# Patient Record
Sex: MALE | Race: WHITE | NOT HISPANIC OR LATINO | Employment: OTHER | URBAN - METROPOLITAN AREA
[De-identification: names, ages, dates, MRNs, and addresses within clinical notes are randomized per-mention and may not be internally consistent; named-entity substitution may affect disease eponyms.]

---

## 2017-11-17 DIAGNOSIS — H69.90 DYSFUNCTION OF EUSTACHIAN TUBE, UNSPECIFIED LATERALITY: Primary | ICD-10-CM

## 2017-11-21 ENCOUNTER — OFFICE VISIT (OUTPATIENT)
Dept: AUDIOLOGY | Facility: CLINIC | Age: 72
End: 2017-11-21

## 2017-11-21 ENCOUNTER — OFFICE VISIT (OUTPATIENT)
Dept: OTOLARYNGOLOGY | Facility: CLINIC | Age: 72
End: 2017-11-21

## 2017-11-21 DIAGNOSIS — H90.A22 SENSORINEURAL HEARING LOSS (SNHL) OF LEFT EAR WITH RESTRICTED HEARING OF RIGHT EAR: ICD-10-CM

## 2017-11-21 DIAGNOSIS — H61.23 EXCESSIVE CERUMEN IN BOTH EAR CANALS: ICD-10-CM

## 2017-11-21 DIAGNOSIS — H72.91 EAR DRUM PERFORATION, RIGHT: ICD-10-CM

## 2017-11-21 DIAGNOSIS — L29.9 EAR ITCHING: Primary | ICD-10-CM

## 2017-11-21 DIAGNOSIS — H90.A31 MIXED CONDUCTIVE AND SENSORINEURAL HEARING LOSS OF RIGHT EAR WITH RESTRICTED HEARING OF LEFT EAR: ICD-10-CM

## 2017-11-21 RX ORDER — CIPROFLOXACIN AND DEXAMETHASONE 3; 1 MG/ML; MG/ML
SUSPENSION/ DROPS AURICULAR (OTIC)
Qty: 7.5 ML | Refills: 1 | Status: SHIPPED | OUTPATIENT
Start: 2017-11-21 | End: 2018-03-07

## 2017-11-21 ASSESSMENT — PAIN SCALES - GENERAL: PAINLEVEL: MODERATE PAIN (4)

## 2017-11-21 NOTE — MR AVS SNAPSHOT
After Visit Summary   2017    Sabiha Pham    MRN: [id number]           Patient Information     Date Of Birth          1945        Visit Information        Provider Department      2017 8:30 AM Nissen, Rick L, MD [facility]        Today's Diagnoses     Ear itching    -  1       Follow-ups after your visit        Who to contact     Please call your clinic at [phone] to:    Ask questions about your health    Make or cancel appointments    Discuss your medicines    Learn about your test results    Speak to your doctor   If you have compliments or concerns about an experience at your clinic, or if you wish to file a complaint, please contact [facility] Patient Relations at [phone] or email us at [email or web address]         Additional Information About Your Visit        MyChart Information     [employer] is an electronic gateway that provides easy, online access to your medical records. With [employer], you can request a clinic appointment, read your test results, renew a prescription or communicate with your care team.     To sign up for [employer] visit the website at [email or web address]   You will be asked to enter the access code listed below, as well as some personal information. Please follow the directions to create your username and password.     Your access code is: [email or web address]  Expires: 2018  6:30 AM     Your access code will  in 90 days. If you need help or a new code, please contact your [facility] or call [phone] for assistance.        Care EveryWhere ID     This is your Care EveryWhere ID. This could be used by other organizations to access your [place] medical records  [id number]         Blood Pressure from Last 3 Encounters:   14 159/82    Weight from Last 3 Encounters:   14 116.6 kg (257 lb 0.9 oz)              Today, you had the following     No  orders found for display         Today's Medication Changes          These changes are accurate as of: 11/21/17  8:30 AM.  If you have any questions, ask your nurse or doctor.               Start taking these medicines.        Dose/Directions    betamethasone valerate 0.1 % ointment   Commonly known as:  VALISONE   Used for:  Ear itching   Started by:  Nissen, Rick L, MD        Apply to bilateral ears BID PRN itching.   Quantity:  15 g   Refills:  0       ciprofloxacin-dexamethasone otic suspension   Commonly known as:  CIPRODEX   Used for:  Ear itching   Started by:  Nissen, Rick L, MD        Instill 3 gtts into both ears BID x 10 days   Quantity:  7.5 mL   Refills:  1            Where to get your medicines      These medications were sent to [facility] [address] [place], [place] [address] [facility] [place] [facility] [place] [address] [address]  [address], [facility] [address]     Phone:  [phone]     betamethasone valerate 0.1 % ointment    ciprofloxacin-dexamethasone otic suspension                Primary Care Provider Office Phone # Fax #    Miguel Angel Vinson [phone]       [facility] [address] [place] [address]        Equal Access to Services     PRESLEY STOUT AH: Hadii aad ku hadasho Soomaali, waaxda luqadaha, qaybta kaalmada adeegyada, waxay idiin hayaan adeeg kharazakiya la'jeff ah. So [facility] [phone].    ATENCIÓN: Si habla español, tiene a mahajan disposición servicios gratuitos de asistencia lingüística. ame al [phone].    We comply with applicable federal civil rights laws and [place] laws. We do not discriminate on the basis of race, color, national origin, age, disability, sex, sexual orientation, or gender identity.            Thank you!     Thank you for choosing [facility] EAR NOSE AND THROAT  for your care. Our goal is always to provide you with excellent care. Hearing back from our patients is one way we can continue to improve our services. Please  take a few minutes to complete the written survey that you may receive in the mail after your visit with us. Thank you!             Your Updated Medication List - Protect others around you: Learn how to safely use, store and throw away your medicines at www.disposemymeds.org.          This list is accurate as of: 11/21/17  8:30 AM.  Always use your most recent med list.                   Brand Name Dispense Instructions for use Diagnosis    atenolol 50 MG tablet    TENORMIN     Take 50 mg by mouth daily        betamethasone valerate 0.1 % ointment    VALISONE    15 g    Apply to bilateral ears BID PRN itching.    Ear itching       ciprofloxacin-dexamethasone otic suspension    CIPRODEX    7.5 mL    Instill 3 gtts into both ears BID x 10 days    Ear itching       NASAL SPRAY SALINE NA      One spray in each nostril once daily        PAXIL 20 MG tablet   Generic drug:  PARoxetine      Take 20 mg by mouth every morning        XANAX 0.5 MG tablet   Generic drug:  ALPRAZolam      Take 0.5 mg by mouth 3 times daily as needed

## 2017-11-21 NOTE — LETTER
11/21/2017       RE: Sabiha Pham  [address]  [facility] [address]     Dear Colleague,    Thank you for referring your patient, Sabiha Pham, to the [facility] at [facility]. Please see a copy of my visit note below.    Dear Miguel Angel Gonzalez R:    I had the pleasure of seeing Sabiha Pham in followup today at the [facility] Ot[facility].    HISTORY OF PRESENT ILLNESS: Patient is a 72-year-old in today for follow-up from his last visit of 7/28/15. He is a long-time patient of mine from [place]. He did undergo a right tympanoplasty in 2014 from Dr. Morelos. OCR fixation was identified at that time and was not repaired secondary to the perforation. He has a small remaining perforation that we have been monitoring. On his follow-up today, he says he has had some recent drainage, he describes this as more moisture with finger 2 year. There is some odor. He's not had any pain. Denies any dizziness. He has high-frequency sensorineural hearing loss and tinnitus that is stable. He denies any dysphagia, hoarseness, facial paresthesias.    MEDICATIONS: Please refer to the detailed medication reconciliation performed by my nurse today, which I have reviewed and signed.     ALLERGIES:    Allergies   Allergen Reactions     Seasonal Allergies        HABITS:   Alcohol use Yes   Comment: socially    History   Smoking Status     Never Smoker   Smokeless Tobacco     Never Used         PAST MEDICAL HISTORY:  Please see today's intake form (for the remainder of the PMH) which I reviewed and signed.  Past Medical History:   Diagnosis Date     Anxiety disorder      Crohn's colitis (H)      Headache(784.0)      Hearing loss      Meniere's disease      Noise effect on both inner ears      DEEDEE (obstructive sleep apnea)      Ringing in the ears      Sleep disorder      Snoring      Weight gain        FAMILY HISTORY/SOCIAL HISTORY:    Family  History   Problem Relation Age of Onset     C.A.D. Brother      Hypertension Mother      Cardiovascular Maternal Aunt     Social History     Social History     Marital status:      Spouse name: N/A     Number of children: N/A     Years of education: N/A     Occupational History     Not on file.     Social History Main Topics     Smoking status: Never Smoker     Smokeless tobacco: Never Used     Alcohol use Yes      Comment: socially     Drug use: No     Sexual activity: Not on file     Other Topics Concern     Not on file     Social History Narrative       REVIEW OF SYSTEMS: Please see today's intake form (for the remainder of the ROS) which I have reviewed and signed.    PHYSICIAL EXAMINATION:  Constitutional: The patient was well-groomed and in no acute distress.   Skin: Warm and pink.  Psychiatric: The patient's affect was calm, cooperative, and appropriate.   Respiratory: Breathing comfortably without stridor or exertion of accessory muscles.  Eyes: Pupils were equal and reactive. Extraocular movement intact.   Head: Normocephalic and atraumatic. No lesions or scars.  Ears: Both ears examined under the microscope and cleaned of cerumen debris with suction and curet. There is some old dried drainage present as well that was cleaned. After cleaning, he has a tube size inferior perforation that looks long-term and stable. The left side was cleaned of cerumen with curettes. Following cleaning, TM looks intact with no worrisome changes or active drainage.  Nose: Sinuses were nontender. Anterior rhinoscopy revealed midline septum and absence of purulence or polyps.  Oral Cavity: Normal tongue, floor of moth, buccal mucosa, and palate. No abnormal lymph tissue in the oropharynx.   Neck: The parotid is soft without masses. Supple with normal laryngeal and tracheal landmarks.   Lymphatic: There is no palpable lymphadenopathy or other masses in the neck.   Neurologic: Alert and oriented x 3. Cranial nerves III-XI  within normal limits. Voice quality normal.  Cerebellar Function Tests:  Grossly normal    Audiogram:  Audiogram performed shows a bilateral high frequency sensorineural hearing loss above 2000 Hz. He has a low-frequency conductive loss on the right side. Maintains good discrimination at 86% and 92%.    IMPRESSION AND PLAN: Ear looks stable, recent active infection and debris within the canal cleaned. I'm going to have him use antibiotic drops 3 times a day for 10 days, 1 refill with Ciprodex. Also gave him some Valisone ointment to use for itching. We'll see him yearly to monitor and follow along the perforation, sooner with any concerns or problems.    Thank you very much for the opportunity to participate in the care of your patient.    Rick L Nissen MD

## 2017-11-21 NOTE — PROGRESS NOTES
Dear Miguel Angel Gnozalez:    I had the pleasure of seeing Sabiha Pham in followup today at the [facility] Ot[facility].    HISTORY OF PRESENT ILLNESS: Patient is a 72-year-old in today for follow-up from his last visit of 7/28/15. He is a long-time patient of mine from [place]. He did undergo a right tympanoplasty in 2014 from Dr. Morelos. OCR fixation was identified at that time and was not repaired secondary to the perforation. He has a small remaining perforation that we have been monitoring. On his follow-up today, he says he has had some recent drainage, he describes this as more moisture with finger 2 year. There is some odor. He's not had any pain. Denies any dizziness. He has high-frequency sensorineural hearing loss and tinnitus that is stable. He denies any dysphagia, hoarseness, facial paresthesias.    MEDICATIONS: Please refer to the detailed medication reconciliation performed by my nurse today, which I have reviewed and signed.     ALLERGIES:    Allergies   Allergen Reactions     Seasonal Allergies        HABITS:   Alcohol use Yes   Comment: socially    History   Smoking Status     Never Smoker   Smokeless Tobacco     Never Used         PAST MEDICAL HISTORY:  Please see today's intake form (for the remainder of the PMH) which I reviewed and signed.  Past Medical History:   Diagnosis Date     Anxiety disorder      Crohn's colitis (H)      Headache(784.0)      Hearing loss      Meniere's disease      Noise effect on both inner ears      DEEDEE (obstructive sleep apnea)      Ringing in the ears      Sleep disorder      Snoring      Weight gain        FAMILY HISTORY/SOCIAL HISTORY:    Family History   Problem Relation Age of Onset     C.A.D. Brother      Hypertension Mother      Cardiovascular Maternal Aunt     Social History     Social History     Marital status:      Spouse name: N/A     Number of children: N/A     Years of education: N/A     Occupational History      Not on file.     Social History Main Topics     Smoking status: Never Smoker     Smokeless tobacco: Never Used     Alcohol use Yes      Comment: socially     Drug use: No     Sexual activity: Not on file     Other Topics Concern     Not on file     Social History Narrative       REVIEW OF SYSTEMS: Please see today's intake form (for the remainder of the ROS) which I have reviewed and signed.    PHYSICIAL EXAMINATION:  Constitutional: The patient was well-groomed and in no acute distress.   Skin: Warm and pink.  Psychiatric: The patient's affect was calm, cooperative, and appropriate.   Respiratory: Breathing comfortably without stridor or exertion of accessory muscles.  Eyes: Pupils were equal and reactive. Extraocular movement intact.   Head: Normocephalic and atraumatic. No lesions or scars.  Ears: Both ears examined under the microscope and cleaned of cerumen debris with suction and curet. There is some old dried drainage present as well that was cleaned. After cleaning, he has a tube size inferior perforation that looks long-term and stable. The left side was cleaned of cerumen with curettes. Following cleaning, TM looks intact with no worrisome changes or active drainage.  Nose: Sinuses were nontender. Anterior rhinoscopy revealed midline septum and absence of purulence or polyps.  Oral Cavity: Normal tongue, floor of moth, buccal mucosa, and palate. No abnormal lymph tissue in the oropharynx.   Neck: The parotid is soft without masses. Supple with normal laryngeal and tracheal landmarks.   Lymphatic: There is no palpable lymphadenopathy or other masses in the neck.   Neurologic: Alert and oriented x 3. Cranial nerves III-XI within normal limits. Voice quality normal.  Cerebellar Function Tests:  Grossly normal    Audiogram:  Audiogram performed shows a bilateral high frequency sensorineural hearing loss above 2000 Hz. He has a low-frequency conductive loss on the right side. Maintains good discrimination at  86% and 92%.    IMPRESSION AND PLAN: Ear looks stable, recent active infection and debris within the canal cleaned. I'm going to have him use antibiotic drops 3 times a day for 10 days, 1 refill with Ciprodex. Also gave him some Valisone ointment to use for itching. We'll see him yearly to monitor and follow along the perforation, sooner with any concerns or problems.    Thank you very much for the opportunity to participate in the care of your patient.    Rick L Nissen MD

## 2017-11-21 NOTE — PROGRESS NOTES
AUDIOLOGY REPORT    SUMMARY: Audiology visit completed. See audiogram for results.      RECOMMENDATIONS: Follow-up with ENT.    Radha Tate.  Licensed Audiologist  [place] #[id number]

## 2017-11-21 NOTE — MR AVS SNAPSHOT
After Visit Summary   2017    Sabiha Pham    MRN: [id number]           Patient Information     Date Of Birth          1945        Visit Information        Provider Department      2017 7:30 AM Lizabeth Lindsey AuD [facility] Aud[facility]        Today's Diagnoses     Mixed conductive and sensorineural hearing loss of right ear with restricted hearing of left ear        Sensorineural hearing loss (SNHL) of left ear with restricted hearing of right ear           Follow-ups after your visit        Your next 10 appointments already scheduled     2017  8:30 AM CST   (Arrive by 8:15 AM)   RETURN NEUROTOLOGY with Rick L Nissen, MD   [facility] Throat ([facility] [place])    [address] [place] [address]  [facility] [address] [phone]              Who to contact     Please call your clinic at [phone] to:    Ask questions about your health    Make or cancel appointments    Discuss your medicines    Learn about your test results    Speak to your doctor   If you have compliments or concerns about an experience at your clinic, or if you wish to file a complaint, please contact [facility] Patient Relations at [phone] or email us at [email or web address]         Additional Information About Your Visit        MyChart Information     Spark[employer] is an electronic gateway that provides easy, online access to your medical records. With [employer], you can request a clinic appointment, read your test results, renew a prescription or communicate with your care team.     To sign up for Spark[employer]t visit the website at [email or web address]   You will be asked to enter the access code listed below, as well as some personal information. Please follow the directions to create your username and password.     Your access code is: [email or web address]  Expires: 2018  6:30 AM     Your access code will  in 90 days. If you  need help or a new code, please contact your [facility] or call [phone] for assistance.        Care EveryWhere ID     This is your Care EveryWhere ID. This could be used by other organizations to access your [place] medical records  [id number]         Blood Pressure from Last 3 Encounters:   02/11/14 159/82    Weight from Last 3 Encounters:   02/11/14 116.6 kg (257 lb 0.9 oz)              We Performed the Following     AUDIOGRAM/TYMPANOGRAM - INTERFACE     [facility] Eval & Speech Recog ([id number])     Tymps / Reflex   ([id number])        Primary Care Provider Office Phone # Fax #    Miguel Angel Vinson [phone]       [facility] [place] [address] [place] [address]        Equal Access to Services     PRESLEY STOUT : Hadii aad ku hadasho Soomaali, waaxda luqadaha, qaybta kaalmada adeegyada, waxay idiin hayaan adeeg kharash la'aan . So [facility] [phone].    ATENCIÓN: Si habla español, tiene a mahajan disposición servicios gratuitos de asistencia lingüística. [facility] [phone].    We comply with applicable federal civil rights laws and [place] laws. We do not discriminate on the basis of race, color, national origin, age, disability, sex, sexual orientation, or gender identity.            Thank you!     Thank you for choosing [facility] AUDIOLOGY  for your care. Our goal is always to provide you with excellent care. Hearing back from our patients is one way we can continue to improve our services. Please take a few minutes to complete the written survey that you may receive in the mail after your visit with us. Thank you!             Your Updated Medication List - Protect others around you: Learn how to safely use, store and throw away your medicines at www.disposemymeds.org.          This list is accurate as of: 11/21/17  7:58 AM.  Always use your most recent med list.                   Brand Name Dispense Instructions for use Diagnosis     atenolol 50 MG tablet    TENORMIN     Take 50 mg by mouth daily        NASAL SPRAY SALINE NA      One spray in each nostril once daily        ofloxacin 0.3 % otic solution    FLOXIN    10 mL    5 drops to the right ear twice a day for one week    Perforated ear drum, right       PAXIL 20 MG tablet   Generic drug:  PARoxetine      Take 20 mg by mouth every morning        XANAX 0.5 MG tablet   Generic drug:  ALPRAZolam      Take 0.5 mg by mouth 3 times daily as needed

## 2018-03-07 DIAGNOSIS — L29.9 EAR ITCHING: ICD-10-CM

## 2018-03-07 RX ORDER — CIPROFLOXACIN AND DEXAMETHASONE 3; 1 MG/ML; MG/ML
SUSPENSION/ DROPS AURICULAR (OTIC)
Qty: 7.5 ML | Refills: 1 | Status: SHIPPED
Start: 2018-03-07 | End: 2024-02-28

## 2018-03-07 NOTE — TELEPHONE ENCOUNTER
CIPRODEX  Last Written Prescription Date:  11/21/17  Last Fill Quantity: 7.5 ML   # refills: 1  Last Office Visit : 11/21/17  Future Office visit:  NONE    Routing refill request to provider for review/approval because: Drug not active on patient's medication list

## 2021-02-28 DIAGNOSIS — Z11.59 ENCOUNTER FOR SCREENING FOR OTHER VIRAL DISEASES: ICD-10-CM

## 2021-03-13 DIAGNOSIS — Z11.59 ENCOUNTER FOR SCREENING FOR OTHER VIRAL DISEASES: ICD-10-CM

## 2021-03-13 LAB
SARS-COV-2 RNA RESP QL NAA+PROBE: NORMAL
SPECIMEN SOURCE: NORMAL

## 2021-03-13 PROCEDURE — U0003 INFECTIOUS AGENT DETECTION BY NUCLEIC ACID (DNA OR RNA); SEVERE ACUTE RESPIRATORY SYNDROME CORONAVIRUS 2 (SARS-COV-2) (CORONAVIRUS DISEASE [COVID-19]), AMPLIFIED PROBE TECHNIQUE, MAKING USE OF HIGH THROUGHPUT TECHNOLOGIES AS DESCRIBED BY CMS-2020-01-R: HCPCS | Performed by: OPHTHALMOLOGY

## 2021-03-13 PROCEDURE — U0005 INFEC AGEN DETEC AMPLI PROBE: HCPCS | Performed by: OPHTHALMOLOGY

## 2021-03-14 LAB
LABORATORY COMMENT REPORT: NORMAL
SARS-COV-2 RNA RESP QL NAA+PROBE: NEGATIVE
SPECIMEN SOURCE: NORMAL

## 2021-03-15 RX ORDER — ATORVASTATIN CALCIUM 10 MG/1
10 TABLET, FILM COATED ORAL DAILY
COMMUNITY

## 2021-03-16 ENCOUNTER — ANESTHESIA EVENT (OUTPATIENT)
Dept: SURGERY | Facility: CLINIC | Age: 76
End: 2021-03-16
Payer: MEDICARE

## 2021-03-16 NOTE — ANESTHESIA PREPROCEDURE EVALUATION
Anesthesia Pre-Procedure Evaluation    Patient: Sabiha Pham   MRN: [id number] : 1945        Preoperative Diagnosis: Cataract [H26.9]   Procedure : Procedure(s):  Cataract removal with implant.     Past Medical History:   Diagnosis Date     Anxiety disorder      Crohn's colitis (H)      Headache(784.0)      Hearing loss      Meniere's disease      Noise effect on both inner ears      DEEDEE (obstructive sleep apnea)      Ringing in the ears      Sleep disorder      Snoring      Weight gain       Past Surgical History:   Procedure Laterality Date     KNEE SURGERY      x 3     NO HISTORY OF SURGERY       TYMPANOPLASTY  2014    Procedure: TYMPANOPLASTY;  Right Tympanoplasty with Facial Nerve Monitoring;  Surgeon: Carlos Morelos MD;  Location: [facility] OR     wisdom teeth removed[        Allergies   Allergen Reactions     Seasonal Allergies       Social History     Tobacco Use     Smoking status: Never Smoker     Smokeless tobacco: Never Used   Substance Use Topics     Alcohol use: Yes     Comment: socially      Wt Readings from Last 1 Encounters:   14 116.6 kg (257 lb 0.9 oz)        Anesthesia Evaluation   Pt has had prior anesthetic. Type: MAC and General.    No history of anesthetic complications       ROS/MED HX  ENT/Pulmonary:     (+) sleep apnea,     Neurologic: Comment: Meniere's disease      Cardiovascular:     (+) Dyslipidemia hypertension-----    METS/Exercise Tolerance:     Hematologic:  - neg hematologic  ROS     Musculoskeletal:  - neg musculoskeletal ROS     GI/Hepatic:     (+) Inflammatory bowel disease,     Renal/Genitourinary:  - neg Renal ROS     Endo: Comment: Morbid obesity    (+) Obesity,     Psychiatric/Substance Use:     (+) psychiatric history anxiety     Infectious Disease:  - neg infectious disease ROS     Malignancy:  - neg malignancy ROS     Other:  - neg other ROS          Physical Exam    Airway  airway exam normal      Mallampati: I   TM distance: > 3 FB   Neck ROM: full    Mouth opening: > 3 cm    Respiratory Devices and Support         Dental  no notable dental history         Cardiovascular   cardiovascular exam normal          Pulmonary   pulmonary exam normal                OUTSIDE LABS:  CBC:   Lab Results   Component Value Date    HGB 16.0 02/11/2014     BMP:   Lab Results   Component Value Date    POTASSIUM 5.2 02/11/2014     COAGS: No results found for: PTT, INR, FIBR  POC: No results found for: BGM, HCG, HCGS  HEPATIC: No results found for: ALBUMIN, PROTTOTAL, ALT, AST, GGT, ALKPHOS, BILITOTAL, BILIDIRECT, TAJ  OTHER: No results found for: PH, LACT, A1C, MARIO, PHOS, MAG, LIPASE, AMYLASE, TSH, T4, T3, CRP, SED    Anesthesia Plan    ASA Status:  3   NPO Status:  NPO Appropriate    Anesthesia Type: MAC.      Maintenance: Balanced.        Consents    Anesthesia Plan(s) and associated risks, benefits, and realistic alternatives discussed. Questions answered and patient/representative(s) expressed understanding.     - Discussed with:  Patient         Postoperative Care            Comments:                Richmond Baker CRNA, APRN LIBRADO

## 2021-03-17 ENCOUNTER — HOSPITAL ENCOUNTER (OUTPATIENT)
Facility: CLINIC | Age: 76
Discharge: HOME | End: 2021-03-17
Attending: OPHTHALMOLOGY | Admitting: OPHTHALMOLOGY

## 2021-03-17 ENCOUNTER — ANESTHESIA (OUTPATIENT)
Dept: SURGERY | Facility: CLINIC | Age: 76
End: 2021-03-17
Payer: MEDICARE

## 2021-03-17 ENCOUNTER — HOSPITAL ENCOUNTER (OUTPATIENT)
Facility: CLINIC | Age: 76
Discharge: HOME | End: 2021-03-17
Attending: OPHTHALMOLOGY | Admitting: OPHTHALMOLOGY
Payer: MEDICARE

## 2021-03-17 VITALS
OXYGEN SATURATION: 98 % | DIASTOLIC BLOOD PRESSURE: 74 MMHG | HEART RATE: 48 BPM | HEIGHT: 70 IN | WEIGHT: 257 LBS | RESPIRATION RATE: 16 BRPM | BODY MASS INDEX: 36.79 KG/M2 | TEMPERATURE: 98.4 F | SYSTOLIC BLOOD PRESSURE: 133 MMHG

## 2021-03-17 PROCEDURE — 250N000009 HC RX 250: Performed by: OPHTHALMOLOGY

## 2021-03-17 PROCEDURE — 258N000003 HC RX IP 258 OP 636: Performed by: NURSE ANESTHETIST, CERTIFIED REGISTERED

## 2021-03-17 PROCEDURE — 360N000007 HC CATARACT SURGICAL PACKAGE: Performed by: OPHTHALMOLOGY

## 2021-03-17 PROCEDURE — V2632 POST CHMBR INTRAOCULAR LENS: HCPCS | Performed by: OPHTHALMOLOGY

## 2021-03-17 PROCEDURE — 250N000009 HC RX 250: Performed by: NURSE ANESTHETIST, CERTIFIED REGISTERED

## 2021-03-17 PROCEDURE — 370N000004 HC ANESTHESIA CATARACT PACKAGE: Performed by: OPHTHALMOLOGY

## 2021-03-17 PROCEDURE — 250N000011 HC RX IP 250 OP 636: Performed by: OPHTHALMOLOGY

## 2021-03-17 PROCEDURE — 761N000008 HC RECOVERY CATRACT PACKAGE: Performed by: OPHTHALMOLOGY

## 2021-03-17 PROCEDURE — 250N000011 HC RX IP 250 OP 636: Performed by: NURSE ANESTHETIST, CERTIFIED REGISTERED

## 2021-03-17 PROCEDURE — V2788 PRESBYOPIA-CORRECT FUNCTION: HCPCS | Performed by: OPHTHALMOLOGY

## 2021-03-17 RX ORDER — PROPARACAINE HYDROCHLORIDE 5 MG/ML
SOLUTION/ DROPS OPHTHALMIC PRN
Status: DISCONTINUED | OUTPATIENT
Start: 2021-03-17 | End: 2021-03-17 | Stop reason: HOSPADM

## 2021-03-17 RX ORDER — TROPICAMIDE 10 MG/ML
1 SOLUTION/ DROPS OPHTHALMIC
Status: COMPLETED | OUTPATIENT
Start: 2021-03-17 | End: 2021-03-17

## 2021-03-17 RX ORDER — LIDOCAINE HYDROCHLORIDE 20 MG/ML
JELLY TOPICAL PRN
Status: DISCONTINUED | OUTPATIENT
Start: 2021-03-17 | End: 2021-03-17 | Stop reason: HOSPADM

## 2021-03-17 RX ORDER — BALANCED SALT SOLUTION 6.4; .75; .48; .3; 3.9; 1.7 MG/ML; MG/ML; MG/ML; MG/ML; MG/ML; MG/ML
SOLUTION OPHTHALMIC PRN
Status: DISCONTINUED | OUTPATIENT
Start: 2021-03-17 | End: 2021-03-17 | Stop reason: HOSPADM

## 2021-03-17 RX ORDER — LIDOCAINE 40 MG/G
CREAM TOPICAL
Status: DISCONTINUED | OUTPATIENT
Start: 2021-03-17 | End: 2021-03-17 | Stop reason: HOSPADM

## 2021-03-17 RX ORDER — SODIUM CHLORIDE, SODIUM LACTATE, POTASSIUM CHLORIDE, CALCIUM CHLORIDE 600; 310; 30; 20 MG/100ML; MG/100ML; MG/100ML; MG/100ML
INJECTION, SOLUTION INTRAVENOUS CONTINUOUS
Status: CANCELLED | OUTPATIENT
Start: 2021-03-17

## 2021-03-17 RX ORDER — SODIUM CHLORIDE, SODIUM LACTATE, POTASSIUM CHLORIDE, CALCIUM CHLORIDE 600; 310; 30; 20 MG/100ML; MG/100ML; MG/100ML; MG/100ML
INJECTION, SOLUTION INTRAVENOUS CONTINUOUS
Status: DISCONTINUED | OUTPATIENT
Start: 2021-03-17 | End: 2021-03-17 | Stop reason: HOSPADM

## 2021-03-17 RX ADMIN — CYCLOPENTOLATE HYDROCHLORIDE AND PHENYLEPHRINE HYDROCHLORIDE 1 DROP: 2; 10 SOLUTION/ DROPS OPHTHALMIC at 10:30

## 2021-03-17 RX ADMIN — SODIUM CHLORIDE, POTASSIUM CHLORIDE, SODIUM LACTATE AND CALCIUM CHLORIDE: 600; 310; 30; 20 INJECTION, SOLUTION INTRAVENOUS at 10:39

## 2021-03-17 RX ADMIN — CYCLOPENTOLATE HYDROCHLORIDE AND PHENYLEPHRINE HYDROCHLORIDE 1 DROP: 2; 10 SOLUTION/ DROPS OPHTHALMIC at 10:39

## 2021-03-17 RX ADMIN — LIDOCAINE HYDROCHLORIDE 0.4 ML: 10 INJECTION, SOLUTION EPIDURAL; INFILTRATION; INTRACAUDAL; PERINEURAL at 10:38

## 2021-03-17 RX ADMIN — TROPICAMIDE 1 DROP: 10 SOLUTION/ DROPS OPHTHALMIC at 10:39

## 2021-03-17 RX ADMIN — TROPICAMIDE 1 DROP: 10 SOLUTION/ DROPS OPHTHALMIC at 10:44

## 2021-03-17 RX ADMIN — CYCLOPENTOLATE HYDROCHLORIDE AND PHENYLEPHRINE HYDROCHLORIDE 1 DROP: 2; 10 SOLUTION/ DROPS OPHTHALMIC at 10:45

## 2021-03-17 RX ADMIN — TROPICAMIDE 1 DROP: 10 SOLUTION/ DROPS OPHTHALMIC at 10:29

## 2021-03-17 RX ADMIN — MIDAZOLAM 2 MG: 1 INJECTION INTRAMUSCULAR; INTRAVENOUS at 11:29

## 2021-03-17 ASSESSMENT — MIFFLIN-ST. JEOR: SCORE: 1909.05

## 2021-03-17 NOTE — OP NOTE
OPHTHALMOLOGY OPERATIVE NOTE    PATIENT: Sabiha Pham  DATE OF SURGERY: 3/17/2021  PREOPERATIVE DIAGNOSIS:  Senile Nuclear Cataract, Right eye  POSTOPERATIVE DIAGNOSIS:  Senile Nuclear Cataract, Right eye  OPERATIVE PROCEDURE:  Phacoemulsification with placement of advanced toric multifocal intraocular lens  SURGEON:  Kg Sweet MD  ANESTHESIA:  Topical / MAC  EBL:  None  SPECIMENS:  None  COMPLICATIONS:  None    PROCEDURE:  The patient was brought to the operating room at [facility].  The eye was anesthetized with topical anesthetic, and a sterile marking pen was used to yaneth the limbus at the 0 and 180 axis.  The right eye was prepped and draped in the usual fashion for cataract surgery.  A wire lid speculum was inserted.  A super sharp blade was used to make a paracentesis at the 11 O'clock position.  The super sharp blade was used to make a partial thickness temporal groove, which was 3 mm in length.  0.8 mL of non-preserved epi-Shugarcaine was injected into the anterior chamber.  Viscoelastic was used to inflate the anterior chamber through a cannula.  A 2.5 mm microkeratome was used to make a temporal clear corneal incision in a two-plane fashion.  A cystotome needle and forceps were used to make a capsulorrhexis.  Hydrodissection and hydrodelineation were performed with Balance Salt Solution.  The lens was then phacoemulsified and removed without complications.  The cortical material was removed with bimanual irrigation and aspiration.  The capsular bag was filled with viscoelastic.  A posterior chamber intraocular lens, preselected and recorded, was folded and inserted into the capsular bag.  The lens was then rotated to the correct axis according to the preoperative data, using the limbal markings and an axial gauge.  The viscoelastic was removed with the irrigation and aspiration tip.  Balanced Salt Solution with Vigamox, 150mg/0.1mL, was used to refill the anterior  chamber, and make final lens axis adjustments.  The wounds were checked for water tightness and required no suture.  The wire lid speculum was removed.  The patient's right eye was cleaned and a drop of each post-operative drop was placed, followed by a pardo shield.  The patient tolerated the procedure well, and there were no complications.      Kg Sweet MD

## 2021-03-17 NOTE — H&P
[facility]  [facility]  [address]  [facility] [address]  [phone]  Dept: [phone]    OPHTHALMOLOGY PRE-OPERATIVE  HISTORY AND PHYSICAL    DATE OF H/P:  2021    DATE OF SURGERY:  2021  PROCEDURE:  Procedure(s):  Cataract removal with implant. Symfony, Right Eye  LENS IMPLANT:  [id number] +12.0  REFRACTIVE GOAL:  PL Sph  SURGEON:  Kg Sweet MD    ANESTHESIA:  TOPICAL / MAC    OR CASE REQUIREMENTS:  Symfony toric IOL with axis at 168 degrees.    DEMOGRAPHICS:  Demographic Information on Sabiha Pham:    Sabiha Pham  Gender: male  : 1945  [address]  [facility] [address]  [phone] (home) [phone] (work)    Medical Record: [id number]  Social Security Number: [id number]  Pharmacy: [id number] [address] AT [facility]  Primary Care Provider: No Ref-Primary, Physician    Parent's names are: Data Unavailable (mother) and Data Unavailable (father).    Insurance: Payor: MEDICARE / Plan: MEDICARE / Product Type: Medicare /     OCULAR HISTORY:  Cataracts, each eye.  Astigmatism / presbyopia.    HISTORIES:  Past Medical History:   Diagnosis Date     Anxiety disorder      Crohn's colitis (H)      Headache(784.0)      Hearing loss      Meniere's disease      Noise effect on both inner ears      DEEDEE (obstructive sleep apnea)      Ringing in the ears      Sleep disorder      Snoring      Weight gain        Past Surgical History:   Procedure Laterality Date     KNEE SURGERY      x 3     NO HISTORY OF SURGERY       TYMPANOPLASTY  2014    Procedure: TYMPANOPLASTY;  Right Tympanoplasty with Facial Nerve Monitoring;  Surgeon: Carlos Morelos MD;  Location: [facility] OR     wisdom teeth removed[         Family History   Problem Relation Age of Onset     C.A.D. Brother      Hypertension Mother      Cardiovascular Maternal Aunt        Social History     Tobacco Use     Smoking status: Never Smoker      Smokeless tobacco: Never Used   Substance Use Topics     Alcohol use: Yes     Comment: socially       MEDICATIONS:  No current facility-administered medications for this encounter.      Current Outpatient Medications   Medication Sig     atenolol (TENORMIN) 50 MG tablet Take 50 mg by mouth daily     atorvastatin (LIPITOR) 10 MG tablet Take 10 mg by mouth daily     betamethasone valerate (VALISONE) 0.1 % ointment Apply to bilateral ears BID PRN itching.     ciprofloxacin-dexamethasone (CIPRODEX) otic suspension Instill 3 gtts into both ears BID x 10 days     NASAL SPRAY SALINE NA One spray in each nostril once daily     PARoxetine (PAXIL) 20 MG tablet Take 20 mg by mouth every morning       ALLERGIES:     Allergies   Allergen Reactions     Seasonal Allergies        PERTINENT SYSTEMS REVIEW:    1. No - Do you have a history of heart attack, stroke, stent, bypass or surgery on an artery in the head, neck, heart or legs?  2. No - Do you ever have any pain or discomfort in your chest?  3. No - Do you have a history of  Heart Failure?  4. No - Are you troubled by shortness of breath when walking: On the level, up a slight hill or at night?  5. No - Do you currently have a cold, bronchitis or other respiratory infection?  6. No - Do you have a cough, shortness of breath or wheezing?  7. No - Do you sometimes get pains in the calves of your legs when you walk?  8. No - Do you or anyone in your family have previous history of blood clots?  9. No - Do you or does anyone in your family have a serious bleeding problem such as prolonged bleeding following surgeries or cuts?  10. No - Have you ever had problems with anemia or been told to take iron pills?  11. No - Have you had any abnormal blood loss such as black, tarry or bloody stools, or abnormal vaginal bleeding?  12. No - Have you ever had a blood transfusion?  13. No - Have you or any of your relatives ever had problems with anesthesia?  14. No - Do you have sleep  apnea, excessive snoring or daytime drowsiness?  15. No - Do you have any prosthetic heart valves?  16. No - Do you have prosthetic joints?    EXAMINATION:  Vitals were reviewed  Vison:  Va, right - 20/40, left - 20/25;   BAT, right - 20/80, left - 20/70;  HEENT:  Cataract, otherwise unremarkable.  LUNGS:  Clear  CV:  Regular rate and rhythm without murmur  ABD:  Soft and nontender  NEURO:  Alert and nonfocal    IMPRESSION:  Patient cleared for ophthalmic surgery.  Low risk with monitored, light sedation.  I have assessed the patient's DVT risk, and no additional orders necessary.    PLAN:  Procedure(s):  Cataract removal with implant. Symfony, Right Eye      Kg Sweet MD

## 2021-03-17 NOTE — ANESTHESIA POSTPROCEDURE EVALUATION
Patient: Sabiha Pham    Procedure(s):  Cataract removal with implant. Symfony    Diagnosis:Cataract [H26.9]  Diagnosis Additional Information: No value filed.    Anesthesia Type:  MAC    Note:  Disposition: Outpatient   Postop Pain Control: Uneventful            Sign Out: Well controlled pain   PONV: No   Neuro/Psych: Uneventful            Sign Out: Acceptable/Baseline neuro status   Airway/Respiratory: Uneventful            Sign Out: Acceptable/Baseline resp. status   CV/Hemodynamics: Uneventful            Sign Out: Acceptable CV status   Other NRE: NONE   DID A NON-ROUTINE EVENT OCCUR? No         Last vitals:  Vitals:    03/17/21 1014   BP: (!) 161/85   Pulse: (!) 49   Temp: 36.9  C (98.4  F)   SpO2: 99%       Last vitals prior to Anesthesia Care Transfer:  CRNA VITALS  3/17/2021 1117 - 3/17/2021 1148      3/17/2021             NIBP:  138/72    Pulse:  (!) 47    NIBP Mean:  92    SpO2:  96 %          Electronically Signed By: ALFREDITO Ibanez CRNA  March 17, 2021  11:48 AM

## 2021-03-17 NOTE — ANESTHESIA CARE TRANSFER NOTE
Patient: Sabiha Pham    Procedure(s):  Cataract removal with implant. Symfony    Diagnosis: Cataract [H26.9]  Diagnosis Additional Information: No value filed.    Anesthesia Type:   MAC     Note:    Oropharynx: oropharynx clear of all foreign objects  Level of Consciousness: awake  Oxygen Supplementation: room air    Independent Airway: airway patency satisfactory and stable  Dentition: dentition unchanged  Vital Signs Stable: post-procedure vital signs reviewed and stable  Report to RN Given: handoff report given  Patient transferred to: Phase II          Vitals: (Last set prior to Anesthesia Care Transfer)  CRNA VITALS  3/17/2021 1101 - 3/17/2021 1131      3/17/2021             Pulse:  50    SpO2:  94 %    EKG:  NSR        Electronically Signed By: ALFREDITO Ibanez CRNA  March 17, 2021  11:31 AM

## 2021-04-10 DIAGNOSIS — Z11.59 ENCOUNTER FOR SCREENING FOR OTHER VIRAL DISEASES: ICD-10-CM

## 2021-04-10 LAB
SARS-COV-2 RNA RESP QL NAA+PROBE: NORMAL
SPECIMEN SOURCE: NORMAL

## 2021-04-10 PROCEDURE — U0005 INFEC AGEN DETEC AMPLI PROBE: HCPCS | Performed by: OPHTHALMOLOGY

## 2021-04-10 PROCEDURE — U0003 INFECTIOUS AGENT DETECTION BY NUCLEIC ACID (DNA OR RNA); SEVERE ACUTE RESPIRATORY SYNDROME CORONAVIRUS 2 (SARS-COV-2) (CORONAVIRUS DISEASE [COVID-19]), AMPLIFIED PROBE TECHNIQUE, MAKING USE OF HIGH THROUGHPUT TECHNOLOGIES AS DESCRIBED BY CMS-2020-01-R: HCPCS | Performed by: OPHTHALMOLOGY

## 2021-04-13 ENCOUNTER — ANESTHESIA EVENT (OUTPATIENT)
Dept: SURGERY | Facility: CLINIC | Age: 76
End: 2021-04-13
Payer: MEDICARE

## 2021-04-13 NOTE — ANESTHESIA PREPROCEDURE EVALUATION
Anesthesia Pre-Procedure Evaluation    Patient: Sabiha Pham   MRN: [id number] : 1945        Preoperative Diagnosis: Nuclear sclerosis of left eye [H25.12]   Procedure : Procedure(s):  Cataract  Extraction with  Symfony implant.     Past Medical History:   Diagnosis Date     Anxiety disorder      Crohn's colitis (H)      Headache(784.0)      Hearing loss      Meniere's disease      Noise effect on both inner ears      DEEDEE (obstructive sleep apnea)      Ringing in the ears      Sleep disorder      Snoring      Weight gain       Past Surgical History:   Procedure Laterality Date     KNEE SURGERY      x 3     NO HISTORY OF SURGERY       PHACOEMULSIFICATION CLEAR CORNEA WITH DELUXE INTRAOCULAR LENS IMPLANT Right 3/17/2021    Procedure: Cataract removal with implant. Symfony;  Surgeon: Kg Sweet MD;  Location: [facility] OR     TYMPANOPLASTY  2014    Procedure: TYMPANOPLASTY;  Right Tympanoplasty with Facial Nerve Monitoring;  Surgeon: Carlos Morelos MD;  Location: [facility] OR     wisdom teeth removed[        Allergies   Allergen Reactions     Seasonal Allergies       Social History     Tobacco Use     Smoking status: Never Smoker     Smokeless tobacco: Never Used   Substance Use Topics     Alcohol use: Yes     Comment: socially      Wt Readings from Last 1 Encounters:   21 116.6 kg (257 lb)        Anesthesia Evaluation   Pt has had prior anesthetic. Type: General, MAC and Regional.    No history of anesthetic complications       ROS/MED HX  ENT/Pulmonary: Comment: Hearing loss  Meniere's disease    (+) sleep apnea,     Neurologic:       Cardiovascular:     (+) Dyslipidemia hypertension-----    METS/Exercise Tolerance:     Hematologic:       Musculoskeletal:       GI/Hepatic: Comment: Crohn's colitis    (+) Inflammatory bowel disease,     Renal/Genitourinary:       Endo:     (+) Obesity,     Psychiatric/Substance Use:     (+) psychiatric history anxiety     Infectious Disease:       Malignancy:        Other:            Physical Exam    Airway        Mallampati: II   TM distance: > 3 FB   Neck ROM: full   Mouth opening: > 3 cm    Respiratory Devices and Support         Dental  no notable dental history         Cardiovascular   cardiovascular exam normal          Pulmonary   pulmonary exam normal                OUTSIDE LABS:  CBC:   Lab Results   Component Value Date    HGB 16.0 02/11/2014     BMP:   Lab Results   Component Value Date    POTASSIUM 5.2 02/11/2014     COAGS: No results found for: PTT, INR, FIBR  POC: No results found for: BGM, HCG, HCGS  HEPATIC: No results found for: ALBUMIN, PROTTOTAL, ALT, AST, GGT, ALKPHOS, BILITOTAL, BILIDIRECT, TAJ  OTHER: No results found for: PH, LACT, A1C, MARIO, PHOS, MAG, LIPASE, AMYLASE, TSH, T4, T3, CRP, SED    Anesthesia Plan    ASA Status:  2   NPO Status:  NPO Appropriate    Anesthesia Type: MAC.              Consents    Anesthesia Plan(s) and associated risks, benefits, and realistic alternatives discussed. Questions answered and patient/representative(s) expressed understanding.     - Discussed with:  Patient      - Extended Intubation/Ventilatory Support Discussed: No.      - Patient is DNR/DNI Status: No    Use of blood products discussed: No .     Postoperative Care            Comments:                ALFREDITO Larios CRNA

## 2021-04-14 ENCOUNTER — HOSPITAL ENCOUNTER (OUTPATIENT)
Facility: CLINIC | Age: 76
Discharge: HOME | End: 2021-04-14
Attending: OPHTHALMOLOGY | Admitting: OPHTHALMOLOGY

## 2021-04-14 ENCOUNTER — HOSPITAL ENCOUNTER (OUTPATIENT)
Facility: CLINIC | Age: 76
Discharge: HOME | End: 2021-04-14
Attending: OPHTHALMOLOGY | Admitting: OPHTHALMOLOGY
Payer: MEDICARE

## 2021-04-14 ENCOUNTER — ANESTHESIA (OUTPATIENT)
Dept: SURGERY | Facility: CLINIC | Age: 76
End: 2021-04-14
Payer: MEDICARE

## 2021-04-14 VITALS
OXYGEN SATURATION: 97 % | HEIGHT: 69 IN | WEIGHT: 257 LBS | DIASTOLIC BLOOD PRESSURE: 79 MMHG | TEMPERATURE: 98.2 F | BODY MASS INDEX: 38.06 KG/M2 | RESPIRATION RATE: 18 BRPM | SYSTOLIC BLOOD PRESSURE: 139 MMHG | HEART RATE: 52 BPM

## 2021-04-14 PROCEDURE — 370N000004 HC ANESTHESIA CATARACT PACKAGE: Performed by: OPHTHALMOLOGY

## 2021-04-14 PROCEDURE — 250N000009 HC RX 250: Performed by: NURSE ANESTHETIST, CERTIFIED REGISTERED

## 2021-04-14 PROCEDURE — 250N000011 HC RX IP 250 OP 636: Performed by: OPHTHALMOLOGY

## 2021-04-14 PROCEDURE — V2632 POST CHMBR INTRAOCULAR LENS: HCPCS | Performed by: OPHTHALMOLOGY

## 2021-04-14 PROCEDURE — 250N000011 HC RX IP 250 OP 636: Performed by: NURSE ANESTHETIST, CERTIFIED REGISTERED

## 2021-04-14 PROCEDURE — 360N000007 HC CATARACT SURGICAL PACKAGE: Performed by: OPHTHALMOLOGY

## 2021-04-14 PROCEDURE — 258N000003 HC RX IP 258 OP 636: Performed by: NURSE ANESTHETIST, CERTIFIED REGISTERED

## 2021-04-14 PROCEDURE — V2788 PRESBYOPIA-CORRECT FUNCTION: HCPCS | Performed by: OPHTHALMOLOGY

## 2021-04-14 PROCEDURE — 272N000002 HC OR SUPPLY OTHER OPNP: Performed by: OPHTHALMOLOGY

## 2021-04-14 PROCEDURE — 250N000009 HC RX 250: Performed by: OPHTHALMOLOGY

## 2021-04-14 PROCEDURE — 761N000008 HC RECOVERY CATRACT PACKAGE: Performed by: OPHTHALMOLOGY

## 2021-04-14 RX ORDER — BALANCED SALT SOLUTION 6.4; .75; .48; .3; 3.9; 1.7 MG/ML; MG/ML; MG/ML; MG/ML; MG/ML; MG/ML
SOLUTION OPHTHALMIC PRN
Status: DISCONTINUED | OUTPATIENT
Start: 2021-04-14 | End: 2021-04-14 | Stop reason: HOSPADM

## 2021-04-14 RX ORDER — TROPICAMIDE 10 MG/ML
1 SOLUTION/ DROPS OPHTHALMIC
Status: COMPLETED | OUTPATIENT
Start: 2021-04-14 | End: 2021-04-14

## 2021-04-14 RX ORDER — LIDOCAINE HYDROCHLORIDE 20 MG/ML
JELLY TOPICAL PRN
Status: DISCONTINUED | OUTPATIENT
Start: 2021-04-14 | End: 2021-04-14 | Stop reason: HOSPADM

## 2021-04-14 RX ORDER — PROPARACAINE HYDROCHLORIDE 5 MG/ML
SOLUTION/ DROPS OPHTHALMIC PRN
Status: DISCONTINUED | OUTPATIENT
Start: 2021-04-14 | End: 2021-04-14 | Stop reason: HOSPADM

## 2021-04-14 RX ORDER — SODIUM CHLORIDE, SODIUM LACTATE, POTASSIUM CHLORIDE, CALCIUM CHLORIDE 600; 310; 30; 20 MG/100ML; MG/100ML; MG/100ML; MG/100ML
INJECTION, SOLUTION INTRAVENOUS CONTINUOUS
Status: DISCONTINUED | OUTPATIENT
Start: 2021-04-14 | End: 2021-04-14 | Stop reason: HOSPADM

## 2021-04-14 RX ORDER — LIDOCAINE 40 MG/G
CREAM TOPICAL
Status: DISCONTINUED | OUTPATIENT
Start: 2021-04-14 | End: 2021-04-14 | Stop reason: HOSPADM

## 2021-04-14 RX ADMIN — MIDAZOLAM 2 MG: 1 INJECTION INTRAMUSCULAR; INTRAVENOUS at 09:54

## 2021-04-14 RX ADMIN — TROPICAMIDE 1 DROP: 10 SOLUTION/ DROPS OPHTHALMIC at 09:15

## 2021-04-14 RX ADMIN — CYCLOPENTOLATE HYDROCHLORIDE AND PHENYLEPHRINE HYDROCHLORIDE 1 DROP: 2; 10 SOLUTION/ DROPS OPHTHALMIC at 09:25

## 2021-04-14 RX ADMIN — CYCLOPENTOLATE HYDROCHLORIDE AND PHENYLEPHRINE HYDROCHLORIDE 1 DROP: 2; 10 SOLUTION/ DROPS OPHTHALMIC at 09:15

## 2021-04-14 RX ADMIN — LIDOCAINE HYDROCHLORIDE 0.4 ML: 10 INJECTION, SOLUTION EPIDURAL; INFILTRATION; INTRACAUDAL; PERINEURAL at 09:18

## 2021-04-14 RX ADMIN — TROPICAMIDE 1 DROP: 10 SOLUTION/ DROPS OPHTHALMIC at 09:25

## 2021-04-14 RX ADMIN — CYCLOPENTOLATE HYDROCHLORIDE AND PHENYLEPHRINE HYDROCHLORIDE 1 DROP: 2; 10 SOLUTION/ DROPS OPHTHALMIC at 09:20

## 2021-04-14 RX ADMIN — MIDAZOLAM 1 MG: 1 INJECTION INTRAMUSCULAR; INTRAVENOUS at 09:58

## 2021-04-14 RX ADMIN — TROPICAMIDE 1 DROP: 10 SOLUTION/ DROPS OPHTHALMIC at 09:20

## 2021-04-14 RX ADMIN — MIDAZOLAM 1 MG: 1 INJECTION INTRAMUSCULAR; INTRAVENOUS at 10:02

## 2021-04-14 RX ADMIN — SODIUM CHLORIDE, POTASSIUM CHLORIDE, SODIUM LACTATE AND CALCIUM CHLORIDE: 600; 310; 30; 20 INJECTION, SOLUTION INTRAVENOUS at 09:18

## 2021-04-14 ASSESSMENT — MIFFLIN-ST. JEOR: SCORE: 1901.12

## 2021-04-14 NOTE — ANESTHESIA CARE TRANSFER NOTE
Patient: Sabiha Pham    Procedure(s):  Cataract  Extraction with  Symfony implant.    Diagnosis: Nuclear sclerosis of left eye [H25.12]  Diagnosis Additional Information: No value filed.    Anesthesia Type:   MAC     Note:    Oropharynx: oropharynx clear of all foreign objects and spontaneously breathing  Level of Consciousness: awake      Independent Airway: airway patency satisfactory and stable  Dentition: dentition unchanged  Vital Signs Stable: post-procedure vital signs reviewed and stable  Report to RN Given: handoff report given  Patient transferred to: Phase II    Handoff Report: Identifed the Patient, Identified the Reponsible Provider, Reviewed the pertinent medical history, Discussed the surgical course, Reviewed Intra-OP anesthesia mangement and issues during anesthesia, Set expectations for post-procedure period and Allowed opportunity for questions and acknowledgement of understanding      Vitals: (Last set prior to Anesthesia Care Transfer)  CRNA VITALS  4/14/2021 0947 - 4/14/2021 1017      4/14/2021             Pulse:  (!) 49    SpO2:  98 %        Electronically Signed By: ALFREDITO Matta CRNA  April 14, 2021  10:17 AM

## 2021-04-14 NOTE — ANESTHESIA POSTPROCEDURE EVALUATION
Patient: Sabiha Pham    Procedure(s):  Cataract  Extraction with  Symfony implant.    Diagnosis:Nuclear sclerosis of left eye [H25.12]  Diagnosis Additional Information: No value filed.    Anesthesia Type:  MAC    Note:  Disposition: Outpatient   Postop Pain Control: Uneventful            Sign Out: Well controlled pain   PONV: No   Neuro/Psych: Uneventful            Sign Out: Acceptable/Baseline neuro status   Airway/Respiratory: Uneventful            Sign Out: Acceptable/Baseline resp. status   CV/Hemodynamics: Uneventful            Sign Out: Acceptable CV status   Other NRE: NONE   DID A NON-ROUTINE EVENT OCCUR? No         Last vitals:  Vitals:    04/14/21 0837 04/14/21 0929 04/14/21 1024   BP: (!) 159/81  124/65   Pulse:  52    Resp: 18  18   Temp: 36.8  C (98.2  F)     SpO2: 98%  99%       Last vitals prior to Anesthesia Care Transfer:  CRNA VITALS  4/14/2021 0947 - 4/14/2021 1028      4/14/2021             Pulse:  (!) 49    SpO2:  98 %          Electronically Signed By: ALFREDITO Matta CRNA  April 14, 2021  10:28 AM

## 2021-04-14 NOTE — OP NOTE
OPHTHALMOLOGY OPERATIVE NOTE    PATIENT: Sabiha Pham  DATE OF SURGERY: 4/14/2021  PREOPERATIVE DIAGNOSIS:  Senile Nuclear Cataract, Left eye  POSTOPERATIVE DIAGNOSIS:  Senile Nuclear Cataract, Left eye  OPERATIVE PROCEDURE:  Phacoemulsification with placement of advanced toric multifocal intraocular lens  SURGEON:  Kg Sweet MD  ANESTHESIA:  Topical / MAC  EBL:  None  SPECIMENS:  None  COMPLICATIONS:  None    PROCEDURE:  The patient was brought to the operating room at [facility].  The eye was anesthetized with topical anesthetic, and a sterile marking pen was used to yaneth the limbus at the 0 and 180 axis.  The left eye was prepped and draped in the usual fashion for cataract surgery.  A wire lid speculum was inserted.  A super sharp blade was used to make a paracentesis at the 5 O'clock position.  The super sharp blade was used to make a partial thickness temporal groove, which was 3 mm in length.  0.8 mL of non-preserved epi-Shugarcaine was injected into the anterior chamber.  Viscoelastic was used to inflate the anterior chamber through a cannula.  A 2.5 mm microkeratome was used to make a temporal clear corneal incision in a two-plane fashion.  A cystotome needle and forceps were used to make a capsulorrhexis.  Hydrodissection and hydrodelineation were performed with Balance Salt Solution.  The lens was then phacoemulsified and removed without complications.  The cortical material was removed with bimanual irrigation and aspiration.  The capsular bag was filled with viscoelastic.  A posterior chamber intraocular lens, preselected and recorded, was folded and inserted into the capsular bag.  The lens was then rotated to the correct axis according to the preoperative data, using the limbal markings and an axial gauge.  The viscoelastic was removed with the irrigation and aspiration tip.  Balanced Salt Solution with Vigamox, 150mg/0.1mL,was used to refill the anterior  chamber, and make final lens axis adjustments.  The wounds were checked for water tightness and required no suture.  The wire lid speculum was removed.  The patient's left eye was cleaned and a drop of each post-operative drop was placed, followed by a pardo shield.  The patient tolerated the procedure well, and there were no complications.      Kg Sweet MD

## 2021-04-15 NOTE — ADDENDUM NOTE
Addendum  created 04/15/21 1055 by Gabriela Walker APRN CRNA    Intraprocedure Event edited, Intraprocedure Staff edited

## 2023-02-24 ENCOUNTER — TRANSCRIBE ORDERS (OUTPATIENT)
Dept: OTHER | Age: 78
End: 2023-02-24

## 2023-02-24 DIAGNOSIS — C44.311 BASAL CELL CARCINOMA (BCC) OF SKIN OF NOSE: Primary | ICD-10-CM

## 2023-02-28 ENCOUNTER — TELEPHONE (OUTPATIENT)
Dept: DERMATOLOGY | Facility: CLINIC | Age: 78
End: 2023-02-28
Payer: MEDICARE

## 2023-02-28 NOTE — TELEPHONE ENCOUNTER
Left patient a voicemail to schedule a consult & mohs for BCC of skin of right nasal ala with Dr. Crowell in . Provided my direct phone number.    Marisol Leo on 2/28/2023 at 8:55 AM

## 2023-03-01 NOTE — TELEPHONE ENCOUNTER
Called patient to schedule surgery with Dr. Crowell    Date of Surgery: 05/08    Surgery type: mohs    Consult scheduled: Yes    Has patient had mohs with us before? No    Additional comments: malu Leo on 3/1/2023 at 3:12 PM

## 2023-03-26 ENCOUNTER — HEALTH MAINTENANCE LETTER (OUTPATIENT)
Age: 78
End: 2023-03-26

## 2023-05-01 ENCOUNTER — OFFICE VISIT (OUTPATIENT)
Dept: DERMATOLOGY | Facility: CLINIC | Age: 78
End: 2023-05-01
Payer: MEDICARE

## 2023-05-01 DIAGNOSIS — C44.311 BASAL CELL CARCINOMA (BCC) OF RIGHT ALA NASI: Primary | ICD-10-CM

## 2023-05-01 PROCEDURE — 99203 OFFICE O/P NEW LOW 30 MIN: CPT | Performed by: DERMATOLOGY

## 2023-05-01 RX ORDER — OFLOXACIN 3 MG/ML
SOLUTION/ DROPS OPHTHALMIC
COMMUNITY
Start: 2022-07-08 | End: 2023-06-08

## 2023-05-01 RX ORDER — PRAVASTATIN SODIUM 40 MG
20 TABLET ORAL
COMMUNITY
Start: 2022-08-15

## 2023-05-01 RX ORDER — LOSARTAN POTASSIUM 100 MG/1
1 TABLET ORAL DAILY
COMMUNITY
Start: 2022-08-15

## 2023-05-01 RX ORDER — CLOBETASOL PROPIONATE 0.5 MG/G
CREAM TOPICAL
COMMUNITY
Start: 2022-11-08

## 2023-05-01 RX ORDER — TAMSULOSIN HYDROCHLORIDE 0.4 MG/1
0.4 CAPSULE ORAL
COMMUNITY
Start: 2022-08-15

## 2023-05-01 RX ORDER — SILDENAFIL 50 MG/1
25 TABLET, FILM COATED ORAL
COMMUNITY
Start: 2022-08-15

## 2023-05-01 RX ORDER — GABAPENTIN 300 MG/1
300 CAPSULE ORAL
COMMUNITY
Start: 2022-08-15

## 2023-05-01 RX ORDER — PAROXETINE 40 MG/1
20 TABLET, FILM COATED ORAL
COMMUNITY
Start: 2022-08-15 | End: 2023-06-08

## 2023-05-01 RX ORDER — TACROLIMUS 1 MG/G
OINTMENT TOPICAL
COMMUNITY
Start: 2022-11-08

## 2023-05-01 ASSESSMENT — PAIN SCALES - GENERAL: PAINLEVEL: MODERATE PAIN (4)

## 2023-05-01 NOTE — NURSING NOTE
Sabiha Pham's chief complaint for this visit includes:  Chief Complaint   Patient presents with     Consult     Mohs consult- BCC in right nasal ala      PCP: No Ref-Primary, Physician    Referring Provider:  No referring provider defined for this encounter.    There were no vitals taken for this visit.  Moderate Pain (4)        Allergies   Allergen Reactions     Seasonal Allergies          Do you need any medication refills at today's visit?    No.      eR Singh LPN             Excision/Mohs previsit information                                                    Diagnosis: basal cell carcinoma  Site(s): Right nasal ala     Over the counter Chlorhexidine surgical soap to wash all skin below the belly button twice before surgery should be recommended for the following:  - Surgical sites below the waist  - Immunosuppressed  - Previous surgical site infection  - Anticipated wound care challenges    Medication & Allergy Information                                                      Review and update allergy and medication list.    Do you take the following medications:    Coumadin, Eliquis, Pradaxa, Xarelto:  NO   -If on Coumadin, INR should be checked within 7 days of surgery.  Range should be 3.5 or less or within therapeutic range.    Past Medical History                                                    Do you currently or have you previously had any of the following conditions:      Hepatitis:  YES- previous had it in 1970. Unsure which A,B, or C. Possibly A.     HIV/AIDS:  NO    Prolonged bleeding or bleeding disorder:  NO    Pacemaker or Defibrillator:  NO.      History of artificial or heart valve replacement:  NO    Endocarditis (inflammation of the inner lining of the heart's chambers and valves):  NO    Have you ever had a prosthetic joint infection:  NO    Pregnant or Breastfeeding:  N/A    Mobility device (wheelchair, transfer difficulty): NO    Important Reminders:                                                         Ok to take all of their medications as prescribed    Patients can eat, no need to be fasting    Patient will not be able to get the site wet for 48 hrs    No submerging wound in standing water (lake, pool, bathtub, hot tub) for 2 weeks    No physical activity for 48 hrs (further restrictions will be discussed by MD at time of visit)    If any positives, send to RN for further review  Re Singh LPN

## 2023-05-01 NOTE — PATIENT INSTRUCTIONS
General Information      Mohs Micrographic Surgery     Mohs Surgery Quick Tips     Please reserve the half day that you are with us and do not schedule any other appointments the morning of your surgery date. We cannot guarantee what time the surgery will be done as it will depend on how many times the Mohs surgeon has to go back and remove more tissue to completely remove your skin cancer.  If you suspect you will experience excess anxiety or claustrophobia during the procedure, please let the Mohs surgeon know prior to surgery to allow us time to address this. If an anti-anxiety medication is required, then you will need a designated  to drive you home.  Mohs surgery is done under local anesthesia, so you should eat breakfast and take your regularly scheduled medications. You do NOT need to fast.  Wear comfortable, loose-fitting clothing that can easily be taken off and put back on before and after surgery.  Most of your time with us will be spent waiting for tissue to be processed and carefully looked at under the microscope by the Mohs surgeon. Bring with you a book, tablet, or smartphone that you can use to spend the waiting time. You are allowed to eat lunch.  You should have a designated  if surgery will involve an area that can occlude vision. This is because you can get swelling/bruising immediately after surgery and will go home with a bulky bandage that could obstruct your view of driving safely.  In most cases, you will go home with a bulky pressure dressing over the site that will need to remain on, clean, and dry for the first 48 hours. You will not be able to get the site wet for the first 48 hours. This bulky pressure dressing is to help prevent post-op bleeding. Your nurse will provide detailed wound care instructions verbally and in writing on the day of surgery.  The overwhelming majority of patients manage their normal post-op pain with Tylenol alternating with ibuprofen.   Any time  the skin is cut surgically, including with Mohs surgery, a scar forms. Scars are unavoidable but are minimized with the Mohs surgery approach. A scar is thought to be better than a skin cancer that could become a serious risk to your future health. The goal with Mohs surgery is for the scar to be barely noticeable by an acquaintance talking with you at a normal conversational distance (say, 4-5 feet away) by 3 months after your surgery. In the meantime, please be patient as it takes about 3 months for scars to mature in appearance and begin to fade.  Do NOT wear make-up on the day of surgery if the skin cancer is on your face.  Do NOT use Neosporin on your wound. It is not effective at preventing infections and can lead to irritation similar to an allergic reaction at the wound site. Do not use harsh soaps like Dial soap or [place] on your wound as this will also lead to irritation.     What is  Mohs micrographic surgery ?     Mohs micrographic surgery is considered the most effective treatment for many skin cancers. It is named after the late Dr. Frederic Mohs, who pioneered this technique. Note that  Mohs  surgery is not an abbreviation or acronym, but is named after Dr. Mohs.  Overview of Mohs Micrographic Surgery  Skin cancer often resembles the  tip of the iceberg  with more tumor cells growing downward and outward into the skin like the roots of a tree. These  roots  are not visible with the naked eye, but instead can be seen under a microscope. With the Mohs technique, the dermatologic surgeon can precisely identify and remove an entire tumor while leaving the surrounding healthy tissue intact and unharmed. Mohs surgery has the highest success rate of all treatments for many skin cancers - up to 99%.  Mohs Surgeons  Fellowship-trained Mohs surgeons are specially trained as a cancer surgeon, pathologist, and reconstructive surgeon all in one.  Advantages of Mohs Surgery  Mohs surgery is unique and so  effective because of the way the removed tissue is microscopically examined, evaluating 100% of the surgical margins. The pathologic interpretation of the tissue margins is done on site by the Mohs surgeon, who is specially trained in the reading of these slides.     Advantages of Mohs surgery include:  - Ensuring complete cancer removal during surgery to provide the best cure rate and protect against cancer recurrence  - Minimizing the amount of healthy tissue lost  - Maximizing the functional and cosmetic outcome resulting from surgery  - Repairing the site of the cancer the same day the cancer is removed (in most cases)  - Curing skin cancer when other methods have failed  Other skin cancer treatment methods require the provider to often blindly estimate the amount of tissue to treat and remove, which can result in the unnecessary removal of healthy skin tissue, as well as the skin cancer coming back if any part of it is left behind.     Your Mohs Procedure  Mohs skin cancer surgery is an outpatient procedure, which takes place in our on-site surgical suite. Our suite is equipped with a dedicated Mohs laboratory for microscopic examination of tissue. Surgeries start early in the morning and are completed the same day, depending on the extent of the tumor and the amount of reconstruction necessary.  First, you will receive local anesthesia (i.e., injections with lidocaine anesthetic) around the area of the tumor, so you are awake during the entire procedure. The use of local anesthesia versus general anesthesia provides many benefits, including preventing a lengthy recovery, possible side effects from general anesthesia, and cost. You are completely numb in the area of the surgery, however, so the procedure is comfortable.      After the area has been numbed, all visible tumor, along with a thin layer of surrounding tissue, is taken out with a scalpel blade. After this, patients are bandaged by our nursing staff  and can rest in the reception area, cafe on the first floor, or can remain in the patient room while awaiting testing results. A specially trained technician prepares the tissue and puts it on slides for your Mohs surgeon to examine under a microscope. Waiting time can range from 60-90 minutes while the tissue is being processed. If cancer involving the edges of the removed tissue is seen by the Mohs surgeon under the microscope, then another layer of tissue is removed from the area where the cancer was detected using a scalpel blade. This way only cancerous tissue is targeted during the procedure, minimizing the loss of healthy tissue. These steps are repeated until no cancer is remaining. Most skin cancers require 1 to 3 rounds for complete removal (all performed on the same day). Aggressive cancers can take several rounds of surgery to cure. Sometimes skin cancer can appear to be small to the naked eye but in fact is larger in reality. This means that the wound left behind after skin cancer removal could be larger than what you may expect, but remember that this may be necessary to remove all the skin cancer present. You do not want any skin cancer to be left behind as that will lead to the skin cancer coming back within months to years and may require a larger surgery.  After Your Skin Cancer is Removed  When your surgery is complete, your Mohs surgeon, who has expertise in reconstructive surgery, assesses the wound and discusses your options for the best functional and cosmetic outcome.      Closing the wound:  Depending on the size of the wound and what your Mohs surgeon decides, your wound will be closed using one of the following options:  Letting the wound heal on its own from the edges and without stitches  Using stitches to close the wound in a line  Using stitches to close the wound by shifting skin from a nearby area of skin (called a  skin flap )  Using stitches to place a skin graft from another part  of the body on the wound     Every wound is specially managed to maximize the functional and cosmetic outcome. For reconstruction on the head, we take great care to make sure stitches, if needed, do not interfere with the resting positions of the eyelids, nose, mouth, and ears, so we sometimes have to use facial plastic surgery techniques to close wounds. Patients are sometimes surprised by how many stitches are used, but this is because we want to do a good job at carefully lining up the wound edges to minimize scarring and prevent the wound from opening up later on, which could lead to an infection. We try to use stitches that self-dissolve whenever possible. In most cases, you will go home with a bulky pressure dressing over the surgical site that will need to remain on, clean, and dry for the first 48 hours. You will not be able to get the site wet for the first 48 hours. This bulky pressure dressing is to help prevent post-op bleeding. Your nurse will give you detailed wound care instructions verbally and in writing on the day of your surgery.     Post-op pain  Tylenol and ibuprofen are sufficient for pain control for the overwhelming majority of patients. If you have been told by another physician not to take Tylenol (also called acetaminophen) or ibuprofen (also called Motrin or Advil), then let your Mohs surgeon know. We typically suggest taking Tylenol 1,000 mg (i.e., two extra strength tabs) every 8 hours. In between those doses of Tylenol, you can take ibuprofen 400 mg (two tabs) every eight hours. This essentially means that you would take either Tylenol or ibuprofen alternating every four hours. Do NOT take more than 4,000 mg of Tylenol or 3,200 mg of ibuprofen per 24 hour period. Icing for 15 minutes every hour will help with pain and swelling as well, and it is especially helpful for surgeries around the eyes. Keeping the wound area elevated will also help with swelling and pain. If your wound is on  your lower leg, then wearing compression stockings can reduce swelling and speed healing.     Expectations About Surgical Scars  Any time the skin is cut, the body forms a scar. This is normal and natural, and a scar is thought to be better than a skin cancer that could become a serious risk to your health. The goal with Mohs surgery is for the scar to be barely noticeable by an acquaintance talking with you at a normal conversational distance (say, 4-5 feet away) by 3 months after your surgery. In the meantime, please be patient as it takes about 3 months for scars to mature in appearance and begin to fade. It is important to wear sunscreen over a scar starting about 1 month after surgery as scars do not tan normally and can become discolored, compared with the surrounding skin, after sun exposure. Additionally, gentle scar massage can often be started about 1 month after surgery. This means gentle, kneading massage on the scar for a couple minutes several times a day. After three months of waiting to ensure all inflammation has resolved and the scar has matured, we are happy to see you if you have persistent concerns with scarring. On rare occasions, we perform steroid injections or use a laser to treat a scar. These types of treatments are not  one-and-done  and multiple sessions are usually necessary to help scar appearance.     Wound care instructions:    Leave the initial pressure dressing on for 48 hours. A pressure dressing is placed to provide consistent pressure that will decrease the chance of bleeding.    After the first 48 hours you should remove the dressing and follow the instructions below:   1) Clean the incisions/surrounding area with a gentle cleanser and warm water. This can be done in or out of the shower, but it is important to note that if you do it in the shower it should be the last thing you clean.    2) Pat the area dry with a clean towel.   3) Apply Vaseline over the sutured area.  Use a  new container or the sterile packets given to you, do not use Vaseline that is in your cupboard as this is likely contaminated. The Vaseline will keep the sutures moist, help dissolvable sutures dissolve, and prevent scabbing from occurring which can cause scarring. Do not apply anything other than Vaseline (no bacitracin, hydrogen peroxide, etc.) for the first 2 weeks as this may cause the sutures to dissolve sooner than appropriate.   4) Cover the area with a non-stick gauze and tape or another bandage of your choice.    5) Make sure that you practice good hand hygiene prior to removing the bandage and use a q tip to apply Vaseline to the area.    6) Repeat the steps above daily for the first 2 weeks after surgery.          Risks:    Scar formation at the site of tumor removal. You may need further treatment with steroid injections/lasers for scarring.    Larger than expected wound created upon removal of the skin cancer.   Poor wound healing, which may be due to the patient's underlying health conditions or failure of the wound repair method.   Excessive bleeding from the wound, which could affect wound healing and/or result in the need for more office visits.    Wound that becomes infected (an uncommon occurrence, minimized by using sterile technique).   Loss of nerve function (muscle or feeling) if a tumor invades a nerve, which can be temporary or permanent.   Regrowth of tumor after removal (more common with previously treated tumors and large, longstanding tumors).   Cosmetic or functional deformities if tumor is near or on an important structure  ture such as eyes, eyelids, nose, ears, lips, forehead, scalp, fingers, or genital area.      If you have any questions, please feel free to contact us.    Phone numbers:    During business hours (M-F 8:00-4:30 p.m.)    [facility]   [phone]

## 2023-05-01 NOTE — PROGRESS NOTES
[facility] Dermatology Note  Encounter Date: May 1, 2023  Office Visit     Dermatology Problem List:  1. NMSC  - BCC, R eriberto tovari; Mohs scheduled 5/8/23  2. Vitiligo  3. Hx of Actinic keratoses, s/p cryotherapy    Gen Derm at [facility]  ____________________________________________    Assessment & Plan:    # - BCC, R ala nasi; Mohs scheduled 5/8/23    - The nature, risks, benefits, and alternatives to Mohs surgery were discussed. The patient would like to proceed with Mohs surgery.  - He does not take anticoagulants.  - No indication for preop antibiotics.   - Likely repair, lifting or sliding flap versus interpolation flap.      Staff and Medical Student:     Nery Sepulveda, medical student, [employer]    Seen and staffed with Dr. Crowell    Provider Disclosure:   The documentation recorded by the medical student acting as scribe accurately reflects the services I personally performed and the decisions made by me. I personally performed the history, exam, assessment and plan.     Adrian Crowell DO    Department of Dermatology  [facility]: Phone: [phone], Fax:[phone]  [facility]: Phone: [phone], Fax: [phone]    ____________________________________________    CC: Consult (Mohs consult- BCC in right nasal ala )    HPI:  Mr. Sabiha Pham is a(n) 77 year old male who presents today as a new patient for Mohs surgery consultation for BCC of the right ala. He's not had Mohs before, but [facility] He does some weight bearing exercise, but is willing to hold.     Patient is otherwise feeling well, without additional skin concerns.    Labs:  Dermpath report  reviewed.    Physical Exam:  Vitals: There were no vitals taken for this visit.  SKIN: Focused examination of nose was performed.  - right nasal ala there is a 1.0x0.8cm  pearly skin colored papule with adjacent sclerotic macule  - No other lesions of concern on areas examined.     Medications:  Current Outpatient Medications   Medication     atenolol (TENORMIN) 50 MG tablet     atorvastatin (LIPITOR) 10 MG tablet     betamethasone valerate (VALISONE) 0.1 % ointment     ciprofloxacin-dexamethasone (CIPRODEX) otic suspension     clobetasol (TEMOVATE) 0.05 % external cream     gabapentin (NEURONTIN) 300 MG capsule     losartan (COZAAR) 100 MG tablet     NASAL SPRAY SALINE NA     PARoxetine (PAXIL) 20 MG tablet     PARoxetine (PAXIL) 40 MG tablet     pravastatin (PRAVACHOL) 40 MG tablet     sildenafil (VIAGRA) 50 MG tablet     tacrolimus (PROTOPIC) 0.1 % external ointment     tamsulosin (FLOMAX) 0.4 MG capsule     ofloxacin (OCUFLOX) 0.3 % ophthalmic solution     No current facility-administered medications for this visit.      Past Medical History:   Patient Active Problem List   Diagnosis     Otorrhea     Perforated ear drum     Past Medical History:   Diagnosis Date     Anxiety disorder      Crohn's colitis (H)      Headache(784.0)      Hearing loss      Meniere's disease      Noise effect on both inner ears      DEEDEE (obstructive sleep apnea)      Ringing in the ears      Sleep disorder      Snoring      Weight gain

## 2023-05-01 NOTE — LETTER
5/1/2023         RE: Sabiha Pham  [address]  [place] [address]        Dear Colleague,    Thank you for referring your patient, Sabiha Pham, to the [facility]. Please see a copy of my visit note below.    [facility] Dermatology Note  Encounter Date: May 1, 2023  Office Visit     Dermatology Problem List:  1. NMSC  - BCC, R eriberto coronel; Mohs scheduled 5/8/23  2. Vitiligo  3. Hx of Actinic keratoses, s/p cryotherapy    Gen Derm at [facility]  ____________________________________________    Assessment & Plan:    # - BCC, R ala nasi; Mohs scheduled 5/8/23    - The nature, risks, benefits, and alternatives to Mohs surgery were discussed. The patient would like to proceed with Mohs surgery.  - He does not take anticoagulants.  - No indication for preop antibiotics.   - Likely repair, lifting or sliding flap versus interpolation flap.      Staff and Medical Student:     Nery Sepulveda, medical student, [employer]    Seen and staffed with Dr. Crowell    Provider Disclosure:   The documentation recorded by the medical student acting as scribe accurately reflects the services I personally performed and the decisions made by me. I personally performed the history, exam, assessment and plan.     Adrian Crowell DO    Department of Dermatology  [facility]: Phone: [phone], Fax:[phone]  [facility]: Phone: [phone], Fax: [phone]    ____________________________________________    CC: Consult (Mohs consult- BCC in right nasal ala )    HPI:  Mr. Sabiha Pham is a(n) 77 year old male who presents today as a new patient for Mohs surgery consultation for BCC of the right ala. He's not had Mohs before, but [facility] He does some weight bearing exercise, but is willing to hold.     Patient is  otherwise feeling well, without additional skin concerns.    Labs:  Dermpath report  reviewed.    Physical Exam:  Vitals: There were no vitals taken for this visit.  SKIN: Focused examination of nose was performed.  - right nasal ala there is a 1.0x0.8cm pearly skin colored papule with adjacent sclerotic macule  - No other lesions of concern on areas examined.     Medications:  Current Outpatient Medications   Medication     atenolol (TENORMIN) 50 MG tablet     atorvastatin (LIPITOR) 10 MG tablet     betamethasone valerate (VALISONE) 0.1 % ointment     ciprofloxacin-dexamethasone (CIPRODEX) otic suspension     clobetasol (TEMOVATE) 0.05 % external cream     gabapentin (NEURONTIN) 300 MG capsule     losartan (COZAAR) 100 MG tablet     NASAL SPRAY SALINE NA     PARoxetine (PAXIL) 20 MG tablet     PARoxetine (PAXIL) 40 MG tablet     pravastatin (PRAVACHOL) 40 MG tablet     sildenafil (VIAGRA) 50 MG tablet     tacrolimus (PROTOPIC) 0.1 % external ointment     tamsulosin (FLOMAX) 0.4 MG capsule     ofloxacin (OCUFLOX) 0.3 % ophthalmic solution     No current facility-administered medications for this visit.      Past Medical History:   Patient Active Problem List   Diagnosis     Otorrhea     Perforated ear drum     Past Medical History:   Diagnosis Date     Anxiety disorder      Crohn's colitis (H)      Headache(784.0)      Hearing loss      Meniere's disease      Noise effect on both inner ears      DEEDEE (obstructive sleep apnea)      Ringing in the ears      Sleep disorder      Snoring      Weight gain          Again, thank you for allowing me to participate in the care of your patient.        Sincerely,        Adrian Crowell MD

## 2023-05-08 ENCOUNTER — OFFICE VISIT (OUTPATIENT)
Dept: DERMATOLOGY | Facility: CLINIC | Age: 78
End: 2023-05-08
Payer: COMMERCIAL

## 2023-05-08 VITALS — SYSTOLIC BLOOD PRESSURE: 157 MMHG | DIASTOLIC BLOOD PRESSURE: 81 MMHG | HEART RATE: 62 BPM

## 2023-05-08 DIAGNOSIS — C44.311 BASAL CELL CARCINOMA (BCC) OF SKIN OF NOSE: ICD-10-CM

## 2023-05-08 PROCEDURE — 17312 MOHS ADDL STAGE: CPT | Performed by: DERMATOLOGY

## 2023-05-08 PROCEDURE — 21235 EAR CARTILAGE GRAFT: CPT | Performed by: DERMATOLOGY

## 2023-05-08 PROCEDURE — 17311 MOHS 1 STAGE H/N/HF/G: CPT | Performed by: DERMATOLOGY

## 2023-05-08 ASSESSMENT — PAIN SCALES - GENERAL: PAINLEVEL: MODERATE PAIN (5)

## 2023-05-08 NOTE — NURSING NOTE
The following medication was given:     MEDICATION:  Lidocaine with epinephrine 1% [id number]  ROUTE: SQ  SITE: see procedure note  DOSE: 5.5 ml  LOT #: [id number]  : [id number]  EXPIRATION DATE: 09/03/2024  NDC#: 61584-349-47  Was there drug waste? 0.5 ml  Multi-dose vial: Yes    Marlen Rios CMA  May 8, 2023    Vaseline and pressure dressing applied to Mohs site on right nasal ala.  Wound care instructions reviewed with patient and AVS provided.  Patient verbalized understanding.  Patient will follow up for suture removal: N/A.  No further questions or concerns at this time.

## 2023-05-08 NOTE — LETTER
5/8/2023         RE: Sabiha Pham  [address]  [facility] [address]        Dear Colleague,    Thank you for referring your patient, Sabiha Pham, to the [facility]. Please see a copy of my visit note below.    [facility] [place] Procedure Note    Dermatology Problem List:  1. NMSC  - BCC, R ala nasi; s/p Mohs and cartilage graft 5/8/23  2. Vitiligo  3. Hx of Actinic keratoses, s/p cryotherapy     Gen Derm at [facility]  ____________________________________________      Date of Service:  May 8, 2023  Surgery: Mohs micrographic surgery    Case 1  Repair Type: cartilage graft  Repair Size: 1.5 x 1.2 cm  Suture Material: Fast Absorbing Gut 5-0  Tumor Type: BCC - Basal cell carcinoma  Location: right nasal ala  Derm-Path Accession #: [id number]  PreOp Size: 1.2 x 0.9 cm  PostOp Size: 1.5x1.2 cm  Mohs Accession #: [id number]  Level of Defect: cartilage      Procedure:  We discussed the principles of treatment and most likely complications including scarring, bleeding, infection, swelling, pain, crusting, nerve damage, large wound,  incomplete excision, wound dehiscence,  nerve damage, recurrence, and a second procedure may be recommended to obtain the best cosmetic or functional result.    Informed consent was obtained and the patient underwent the procedure as follows:  The patient was placed supine on the operating table.  The cancer was identified, outlined with a marker, and verified by the patient.  The entire surgical field was prepped with Hibiclens;Sterile saline.  The surgical site was anesthetized using Lidocaine 1% with epi 1:100,000.      The area of clinically apparent tumor was debulked. The layer of tissue was then surgically excised using a #15 blade and was then transferred onto a specimen sheet maintaining the orientation of the specimen. Hemostasis was obtained using bipolar electrocoagulation. The wound site was then  covered with a dressing while the tissue samples were processed for examination.    The excised tissue was transported to the Mohs histology laboratory maintaining the tissue orientation.  The tissue specimen was relaxed so that the entire surgical margin was in a a single horizontal plane for sectioning and inked for precise mapping.  A precise reference map was drawn to reflect the sectioning of the specimen, colored inking of the margins, and orientation on the patient.  The tissue was processed using horizontal sectioning of the base and continuous peripheral margins.  The histopathologic sections were reviewed in conjunction with the reference map.    Total blocks: 1    Total slides:  3    Residual tumor was identified and indicated in red on the reference map, identifying the location where further tissue excision was necessary. Therefore, an additional stage of Mohs Micrographic surgery was deemed necessary.     Stage II   The patient was returned to the operating room, and the area prepped in the usual manner. The residual tumor was excised using the reference map as a guide. The specimen was transfered to a labeled specimen sheet maintaining the orientation of the specimen. Hemostasis was obtained and the wound site was covered with a dressing while the tissue was processed for examination.     The excised tissue was transported to the Mohs histology laboratory maintaining orientation. The specimen margins were inked for precise mapping and a reference map was prepared for the is additional stage to maintain precise orientation as described above. The tissue was processed using horizontal sectioning of the base and continuous peripheral margins. The histopathologic sections were reviewed in conjunction with the reference map.     Total blocks: 1    Total slides:  1    There were no cancer cells visualized on examination, therefore Mohs surgery was complete.     Cartilage Graft:  A cartilage graft was  planned for support of the alar rim due to loss of ala fibrofatty support.  After Betasept prep and local anesthesia, through an anterior approach in the conchal bowl, an incision was made and the donor site skin was elevated.  The cartilage was excised and after hemostasis was obtained the donor site skin was inset and sutured into place (4-0 monocryl).  The cartilage graft was then sculpted to fit the defect. Two separate bites of the deep margin of the primary defect were placed with 4-0 monocryl suture. The ends were left draped over the adjacent skin. With #15 blade, two pockets were made medially and laterally in the lateral nasal tip and alar rim remnant. The ends of the cartilage strut were inserted into the pockets above the suture strands. Once securely placed, the sutures were tied over the cartilage apposing the deep margin of the wound to the deep surface of the cartilage graft. Additional sutures were placed to narrow the wound edges, further securing the graft in place. Finally, a 2mm punch tool was used to fenestrate the cartilage to facilitate granulation of the central wound. A layer of sterile petrolatum was applied to the wound and a sterile pressure dressing was placed. The wound care instructions were provided in writing and reviewed verbally. The patient left the clinic in good condition without any apparent complications.       He will return for a wound check in 2 weeks. If sufficient granulation has occurred, a delayed FTSG may be considered to speed up wound healing.     I personally performed the procedures today.      Adrian Crowell DO    Department of Dermatology  [facility]: Phone: [phone], Fax:[phone]  [facility]: Phone: [phone], Fax: [phone]    Care and Laboratory Testing Performed at:  [facility]   Dermatology  [address] [place] [address]      Again, thank you for allowing me to participate in the care of your patient.        Sincerely,        Adrian Crowell MD

## 2023-05-08 NOTE — PATIENT INSTRUCTIONS
Mohs Wound Care Instructions: Left ear   I will experience scar, altered skin color, bleeding, swelling, pain, crusting and redness. I may experience altered sensation. Risks are excessive bleeding, infection, muscle weakness, thick (hypertrophic or keloidal) scar, and recurrence. A second procedure may be recommended to obtain the best cosmetic or functional result.  Possible complications of any surgical procedure are bleeding, infection, scarring, alteration in skin color and sensation, muscle weakness in the area, wound dehiscence or seperation, or recurrence of the lesion or disease. On occasion, after healing, a secondary procedure or revision may be recommended in order to obtain the best cosmetic or functional result.   After your surgery, a pressure bandage will be placed over the area that has sutures. This will help prevent bleeding. Please follow these instructions as they will help you to prevent complications as your wound heals.  For the First 48 hours After Surgery:  Leave the pressure bandage on and keep it dry. If it should come loose, you may retape it, but do not take it off.  Relax and take it easy. Do not do any vigorous exercise, heavy lifting, or bending forward. This could cause the wound to bleed.  Post-operative pain is usually mild. You may alternate between 1000 mg of Tylenol (acetaminophen) and 400 mg of Ibuprofen every 4 hours.  Do not take more than 4,000mg of acetaminophen in a 24 hour period or 3200 mg of Ibuprofen in a 24 hr period.  Avoid alcohol and vitamin E as these may increase your tendency to bleed.  You may put an ice pack around the bandaged area for 20 minutes every 2-3 hours. This may help reduce swelling, bruising, and pain. Make sure the ice pack is waterproof so that the pressure bandage does not get wet.   You may see a small amount of drainage or blood on your pressure bandage. This is normal. However, if drainage or bleeding continues or saturates the bandage, you  will need to apply firm pressure over the bandage with a washcloth for 15 minutes. If bleeding continues after applying pressure for 15 minutes then go to the nearest emergency room.  48 Hours After Surgery  Carefully remove the bandage and start daily wound care and dressing changes. You may also now shower and get the wound wet.  Daily Wound Care:  Wash wound with a mild soap and water.  Use caution when washing the wound, be gentle and do not let the forceful shower stream hit the wound directly.  Pat dry.  Apply Vaseline (from a new container or tube) over the suture line with a Q-tip. It is very important to keep the wound continuously moist, as wounds heal best in a moist environment.  Keep the site covered until sutures have dissolved.  You can cover it with a Telfa (non-stick) dressing and tape or a band-aid.    If you are unable to keep wound covered, you must apply Vaseline every 2-3 hours (while awake) to ensure it is being kept moist for optimal healing. A dressing overnight is recommended to keep the area moist.  Call Us If:  You have pain that is not controlled with Tylenol/Ibuprofen  You have signs or symptoms of an infection, such as: fever over 100 degrees F, redness, warmth, or foul-smelling or yellow drainage from the wound.  Who should I call with questions?  [facility]: [phone]   [facility]: [phone]  For urgent needs outside of business hours call the [facility] at [phone] and ask to speak with the dermatology resident on call           Post-Operative Care for Granulating Site: Left side of nose (left nasal ala)  After your surgery, a pressure bandage will be placed over the area. This will help prevent bleeding. Please follow these instructions as they will help you to prevent complications as your wound heals.  For the First 48 hours After Surgery:  Leave the pressure bandage on and keep it dry. If it should  "come loose, you may retape it, but do not take it off.  Relax and take it easy. Do not do any vigorous exercise, heavy lifting, or bending forward. This could cause the wound to bleed.  Post-operative pain is usually mild. You may take plain or extra strength Tylenol every 4 hours as needed. Do not take any medicine that contains aspirin, ibuprofen or motrin.  Avoid alcohol and vitamin E as these may increase your tendency to bleed.  You may put an ice pack around the bandaged area for 20 minutes every 2-3 hours. This may help reduce swelling, bruising, and pain. Make sure the ice pack is waterproof so that the pressure bandage does not get wet.   You may see a small amount of drainage or blood on your pressure bandage. This is normal. However, if drainage or bleeding continues or saturates the bandage, you will need to apply firm pressure over the bandage with a washcloth for 15 minutes. If bleeding continues after applying pressure for 15 minutes, apply an ice pack to the bandaged area for 15 minutes. If bleeding still continues, go to the nearest emergency room.  48 Hours After Surgery:  Carefully remove the bandage and start daily wound care and dressing changes. You may also now shower and get the wound wet. Use caution when washing the wound, be gentle.  Do not use a wash cloth on the wound.  Daily Wound Care:  Wash with mild soap and water every day until wound appears well-healed.  Rinse well and pat dry.  Apply Vaseline over site with a Q-tip and re-apply a dressing until wound appears well healed.  A well healed wound is \"pinked over\" with a shiny surface.  When area is \"pinked over\" it is no longer necessary to apply Vaseline.  Call Us If:  You have pain that is not controlled with Tylenol.  You have signs or symptoms of an infection, such as: fever over 100 degrees F, redness, warmth, or foul-smelling or yellow/creamy drainage from the wound.  Who should I call with questions?  [facility] " [facility]: [phone]  [facility]: [phone]  For urgent needs outside of business hours call the [facility] at [phone] and ask for the dermatology resident on call

## 2023-05-08 NOTE — NURSING NOTE
Sabiha Pham's chief complaint for this visit includes:  Chief Complaint   Patient presents with     Procedure     Mohs BCC right nasal ala     PCP: No Ref-Primary, Physician    Referring Provider:  Jacob Duenas  [facility]  [address] [place] [address]    BP (!) 157/81   Pulse 62   Moderate Pain (5)        Allergies   Allergen Reactions     Seasonal Allergies          Do you need any medication refills at today's visit?  No    Marlen Rios, CMA

## 2023-05-09 ENCOUNTER — TELEPHONE (OUTPATIENT)
Dept: DERMATOLOGY | Facility: CLINIC | Age: 78
End: 2023-05-09
Payer: MEDICARE

## 2023-05-09 NOTE — TELEPHONE ENCOUNTER
SUBJECTIVE/OBJECTIVE:                                                    Orian is 1 day s/p Mohs for BCC on nose.     ASSESSMENT:                                                       NURSING ASSESSMENT:     Wound location: nose and left ear from cartilage graft       What are you doing to manage your pain?  Tylenol and Ibuprofen    Is it helping?  Yes    Are you applying ice? Yes    Have you had any noticeable bleeding through the bandage?  No, dressing is dry and intact.    Do you have any concerns?  No     PLAN:                                                       Wound care directions reviewed.  Post op appointment confirmed.    Mami Coppola RN

## 2023-05-18 ENCOUNTER — TELEPHONE (OUTPATIENT)
Dept: DERMATOLOGY | Facility: CLINIC | Age: 78
End: 2023-05-18
Payer: MEDICARE

## 2023-05-18 ENCOUNTER — OFFICE VISIT (OUTPATIENT)
Dept: DERMATOLOGY | Facility: CLINIC | Age: 78
End: 2023-05-18
Payer: COMMERCIAL

## 2023-05-18 DIAGNOSIS — Z51.89 VISIT FOR WOUND CHECK: ICD-10-CM

## 2023-05-18 DIAGNOSIS — Z85.828 HISTORY OF BASAL CELL CARCINOMA OF SKIN: Primary | ICD-10-CM

## 2023-05-18 PROCEDURE — 99024 POSTOP FOLLOW-UP VISIT: CPT | Performed by: DERMATOLOGY

## 2023-05-18 NOTE — LETTER
5/18/2023         RE: Sabiha Pham  [address]  [facility] [address]        Dear Colleague,    Thank you for referring your patient, Sabiha Pham, to the [facility]. Please see a copy of my visit note below.    [facility] Dermatology Note  Encounter Date: May 18, 2023  Office Visit     Dermatology Problem List:  1. NMSC  - BCC, R ala nasi, s/p Mohs and cartilage graft 5/8/23  2. Vitiligo  3. Hx of Actinic keratoses, s/p cryotherapy  ____________________________________________    Assessment & Plan:    # BCC, R ala nasi; s/p Mohs scheduled 5/8/23  - Wound on the right side of nose Healing, but relatively dry looking.   - Encouraged more frequent petroleum gel application and occlusion.   - Continue routine wound care.    Procedures Performed:   None.    Follow-up: 2 week(s) in-person, or earlier for new or changing lesions    Staff and Scribe:     Scribe Disclosure:  I, Valery Turner, am serving as a scribe to document services personally performed by Adrian Crowell DO based on data collection and the provider's statements to me.      Staff attestation:  The documentation recorded by the scribe accurately reflects the services I personally performed and the decisions I personally made. I have made edits where needed.    Adrian Crowell DO    Department of Dermatology  [facility]: Phone: [phone], Fax:[phone]  [facility]: Phone: [phone], Fax: [phone]      ____________________________________________    CC: Wound Check    HPI:  Mr. Sabiha Pham is a(n) 77 year old male who presents today as a return patient for wound check following his Mohs procedure on the R nasal ala with me 10 days ago. Patient reports the wound is healing well and is has become less painful over time.    Patient is otherwise feeling well, without  additional skin concerns.    Labs Reviewed:  N/A    Physical Exam:  Vitals: There were no vitals taken for this visit.  SKIN: Focused examination of face and left ear was performed.  - Left ear cartilage donor site: skin edges opposed, focal erosion and hemorraghic crust.  - Right side of nose with surgical ulcer, with cartilage graft visible in base. Reepithelization present at the periphery. Hemorraghic crust present  - No other lesions of concern on areas examined.     Medications:  Current Outpatient Medications   Medication     atenolol (TENORMIN) 50 MG tablet     atorvastatin (LIPITOR) 10 MG tablet     betamethasone valerate (VALISONE) 0.1 % ointment     ciprofloxacin-dexamethasone (CIPRODEX) otic suspension     clobetasol (TEMOVATE) 0.05 % external cream     gabapentin (NEURONTIN) 300 MG capsule     losartan (COZAAR) 100 MG tablet     NASAL SPRAY SALINE NA     ofloxacin (OCUFLOX) 0.3 % ophthalmic solution     PARoxetine (PAXIL) 20 MG tablet     PARoxetine (PAXIL) 40 MG tablet     pravastatin (PRAVACHOL) 40 MG tablet     sildenafil (VIAGRA) 50 MG tablet     tacrolimus (PROTOPIC) 0.1 % external ointment     tamsulosin (FLOMAX) 0.4 MG capsule     No current facility-administered medications for this visit.      Past Medical History:   Patient Active Problem List   Diagnosis     Otorrhea     Perforated ear drum     Past Medical History:   Diagnosis Date     Anxiety disorder      Crohn's colitis (H)      Headache(784.0)      Hearing loss      Meniere's disease      Noise effect on both inner ears      DEEDEE (obstructive sleep apnea)      Ringing in the ears      Sleep disorder      Snoring      Weight gain          Again, thank you for allowing me to participate in the care of your patient.        Sincerely,        Adrian Crowell MD

## 2023-05-18 NOTE — NURSING NOTE
Sabiha Pham's goals for this visit include:   Chief Complaint   Patient presents with     Wound Check       He requests these members of his care team be copied on today's visit information: n/a    PCP: No Ref-Primary, Physician    Referring Provider:  No referring provider defined for this encounter.    There were no vitals taken for this visit.    Do you need any medication refills at today's visit? No  Mami Coppola RN

## 2023-05-18 NOTE — PROGRESS NOTES
[facility] Dermatology Note  Encounter Date: May 18, 2023  Office Visit     Dermatology Problem List:  1. NMSC  - BCC, R ala nasi, s/p Mohs and cartilage graft 5/8/23  2. Vitiligo  3. Hx of Actinic keratoses, s/p cryotherapy  ____________________________________________    Assessment & Plan:    # BCC, R ala nasi; s/p Mohs scheduled 5/8/23  - Wound on the right side of nose Healing, but relatively dry looking.   - Encouraged more frequent petroleum gel application and occlusion.   - Continue routine wound care.    Procedures Performed:   None.    Follow-up: 2 week(s) in-person, or earlier for new or changing lesions    Staff and Scribe:     Scribe Disclosure:  I, Valery Turner, am serving as a scribe to document services personally performed by Adrian Crowell DO based on data collection and the provider's statements to me.      Staff attestation:  The documentation recorded by the scribe accurately reflects the services I personally performed and the decisions I personally made. I have made edits where needed.    Adrian Crowell DO    Department of Dermatology  [facility]: Phone: [phone], Fax:[phone]  [facility]: Phone: [phone], Fax: [phone]      ____________________________________________    CC: Wound Check    HPI:  Mr. Sabiha Pham is a(n) 77 year old male who presents today as a return patient for wound check following his Mohs procedure on the R nasal ala with me 10 days ago. Patient reports the wound is healing well and is has become less painful over time.    Patient is otherwise feeling well, without additional skin concerns.    Labs Reviewed:  N/A    Physical Exam:  Vitals: There were no vitals taken for this visit.  SKIN: Focused examination of face and left ear was performed.  - Left ear cartilage donor site: skin edges opposed, focal erosion and  hemorraghic crust.  - Right side of nose with surgical ulcer, with cartilage graft visible in base. Reepithelization present at the periphery. Hemorraghic crust present  - No other lesions of concern on areas examined.     Medications:  Current Outpatient Medications   Medication     atenolol (TENORMIN) 50 MG tablet     atorvastatin (LIPITOR) 10 MG tablet     betamethasone valerate (VALISONE) 0.1 % ointment     ciprofloxacin-dexamethasone (CIPRODEX) otic suspension     clobetasol (TEMOVATE) 0.05 % external cream     gabapentin (NEURONTIN) 300 MG capsule     losartan (COZAAR) 100 MG tablet     NASAL SPRAY SALINE NA     ofloxacin (OCUFLOX) 0.3 % ophthalmic solution     PARoxetine (PAXIL) 20 MG tablet     PARoxetine (PAXIL) 40 MG tablet     pravastatin (PRAVACHOL) 40 MG tablet     sildenafil (VIAGRA) 50 MG tablet     tacrolimus (PROTOPIC) 0.1 % external ointment     tamsulosin (FLOMAX) 0.4 MG capsule     No current facility-administered medications for this visit.      Past Medical History:   Patient Active Problem List   Diagnosis     Otorrhea     Perforated ear drum     Past Medical History:   Diagnosis Date     Anxiety disorder      Crohn's colitis (H)      Headache(784.0)      Hearing loss      Meniere's disease      Noise effect on both inner ears      DEEDEE (obstructive sleep apnea)      Ringing in the ears      Sleep disorder      Snoring      Weight gain

## 2023-05-18 NOTE — TELEPHONE ENCOUNTER
[facility] Call Center    Phone Message    May a detailed message be left on voicemail: yes     Reason for Call: Other: Patient wants to know if he can reschedule 06/07/2023 with Dr. Crowell to later that day after 2:30PM. Please call back [phone] Thank you     Action Taken: Message routed to:  [facility] ([facility] Derm Surg    Travel Screening: Not Applicable

## 2023-05-18 NOTE — TELEPHONE ENCOUNTER
I called and spoke with Sabiha.    Pt's wife has an oncology appointment on 06/07, so he needed to reschedule his 2 week wound check to a different day. I went ahead and rescheduled for 11:00 am on 06/08. It looks like that is the best solution, but please advise if there is another day/time that you recommend instead.     Marisol Leo, Procedure  5/18/2023 2:20 PM

## 2023-05-24 NOTE — PROGRESS NOTES
[facility] Dermatologic Surgery Clinic [place] Procedure Note    Dermatology Problem List:  1. NMSC  - BCC, R ala nasi; s/p Mohs and cartilage graft 5/8/23  2. Vitiligo  3. Hx of Actinic keratoses, s/p cryotherapy     Gen Derm at [facility]  ____________________________________________      Date of Service:  May 8, 2023  Surgery: Mohs micrographic surgery    Case 1  Repair Type: cartilage graft  Repair Size: 1.5 x 1.2 cm  Suture Material: Fast Absorbing Gut 5-0  Tumor Type: BCC - Basal cell carcinoma  Location: right nasal ala  Derm-Path Accession #: [id number]  PreOp Size: 1.2 x 0.9 cm  PostOp Size: 1.5x1.2 cm  Mohs Accession #: [id number]  Level of Defect: cartilage      Procedure:  We discussed the principles of treatment and most likely complications including scarring, bleeding, infection, swelling, pain, crusting, nerve damage, large wound,  incomplete excision, wound dehiscence,  nerve damage, recurrence, and a second procedure may be recommended to obtain the best cosmetic or functional result.    Informed consent was obtained and the patient underwent the procedure as follows:  The patient was placed supine on the operating table.  The cancer was identified, outlined with a marker, and verified by the patient.  The entire surgical field was prepped with Hibiclens;Sterile saline.  The surgical site was anesthetized using Lidocaine 1% with epi 1:100,000.      The area of clinically apparent tumor was debulked. The layer of tissue was then surgically excised using a #15 blade and was then transferred onto a specimen sheet maintaining the orientation of the specimen. Hemostasis was obtained using bipolar electrocoagulation. The wound site was then covered with a dressing while the tissue samples were processed for examination.    The excised tissue was transported to the Mohs histology laboratory maintaining the tissue orientation.  The tissue specimen was relaxed so that the entire surgical  margin was in a a single horizontal plane for sectioning and inked for precise mapping.  A precise reference map was drawn to reflect the sectioning of the specimen, colored inking of the margins, and orientation on the patient.  The tissue was processed using horizontal sectioning of the base and continuous peripheral margins.  The histopathologic sections were reviewed in conjunction with the reference map.    Total blocks: 1    Total slides:  3    Residual tumor was identified and indicated in red on the reference map, identifying the location where further tissue excision was necessary. Therefore, an additional stage of Mohs Micrographic surgery was deemed necessary.     Stage II   The patient was returned to the operating room, and the area prepped in the usual manner. The residual tumor was excised using the reference map as a guide. The specimen was transfered to a labeled specimen sheet maintaining the orientation of the specimen. Hemostasis was obtained and the wound site was covered with a dressing while the tissue was processed for examination.     The excised tissue was transported to the Mohs histology laboratory maintaining orientation. The specimen margins were inked for precise mapping and a reference map was prepared for the is additional stage to maintain precise orientation as described above. The tissue was processed using horizontal sectioning of the base and continuous peripheral margins. The histopathologic sections were reviewed in conjunction with the reference map.     Total blocks: 1    Total slides:  1    There were no cancer cells visualized on examination, therefore Mohs surgery was complete.     Cartilage Graft:  A cartilage graft was planned for support of the alar rim due to loss of ala fibrofatty support.  After Betasept prep and local anesthesia, through an anterior approach in the conchal bowl, an incision was made and the donor site skin was elevated.  The cartilage was excised  and after hemostasis was obtained the donor site skin was inset and sutured into place (4-0 monocryl).  The cartilage graft was then sculpted to fit the defect. Two separate bites of the deep margin of the primary defect were placed with 4-0 monocryl suture. The ends were left draped over the adjacent skin. With #15 blade, two pockets were made medially and laterally in the lateral nasal tip and alar rim remnant. The ends of the cartilage strut were inserted into the pockets above the suture strands. Once securely placed, the sutures were tied over the cartilage apposing the deep margin of the wound to the deep surface of the cartilage graft. Additional sutures were placed to narrow the wound edges, further securing the graft in place. Finally, a 2mm punch tool was used to fenestrate the cartilage to facilitate granulation of the central wound. A layer of sterile petrolatum was applied to the wound and a sterile pressure dressing was placed. The wound care instructions were provided in writing and reviewed verbally. The patient left the clinic in good condition without any apparent complications.       He will return for a wound check in 2 weeks. If sufficient granulation has occurred, a delayed FTSG may be considered to speed up wound healing.     I personally performed the procedures today.      Adrian Crowell DO    Department of Dermatology  [facility]: Phone: [phone], Fax:[phone]  [facility]: Phone: [phone], Fax: [phone]    Care and Laboratory Testing Performed at:  [facility]   Dermatology Clinic  [address] [place] [address]

## 2023-06-08 ENCOUNTER — OFFICE VISIT (OUTPATIENT)
Dept: DERMATOLOGY | Facility: CLINIC | Age: 78
End: 2023-06-08
Payer: MEDICARE

## 2023-06-08 DIAGNOSIS — Z85.828 HISTORY OF BASAL CELL CARCINOMA OF SKIN: Primary | ICD-10-CM

## 2023-06-08 DIAGNOSIS — Z51.89 VISIT FOR WOUND CHECK: ICD-10-CM

## 2023-06-08 PROCEDURE — 99024 POSTOP FOLLOW-UP VISIT: CPT | Performed by: DERMATOLOGY

## 2023-06-08 NOTE — LETTER
6/8/2023         RE: Sabiha Pham  [address]  [place] [address]        Dear Colleague,    Thank you for referring your patient, Sabiha Pham, to the [facility]. Please see a copy of my visit note below.    [facility] Dermatology Note  Encounter Date: Jun 8, 2023  Office Visit     Dermatology Problem List:  1. NMSC  - BCC, R ala nasi, s/p Mohs and cartilage graft 5/8/23  2. Vitiligo  3. Hx of Actinic keratoses, s/p cryotherapy  ____________________________________________    Assessment & Plan:    # BCC, R ala nasi; s/p Mohs scheduled 5/8/23  - Wound on the right side of nose Healing, but centrally cartilage still present in the bottom of the ulcer.   - Encouraged more frequent petroleum gel application and occlusion.   - Continue routine wound care.    Procedures Performed:   None.    Follow-up: 2 week(s) in-person, or earlier for new or changing lesions    Staff and Scribe:     Scribe Disclosure:  I, Valery Turner, am serving as a scribe to document services personally performed by Adrian Crowell DO based on data collection and the provider's statements to me.      Staff attestation:  The documentation recorded by the scribe accurately reflects the services I personally performed and the decisions I personally made. I have made edits where needed.    Adrian Crowell DO    Department of Dermatology  [facility]: Phone: [phone], Fax:[phone]  [facility]: Phone: [phone], Fax: [phone]      ____________________________________________    CC: Wound Check   HPI:  Mr. Sabiha Pham is a(n) 77 year old male who presents today as a return patient for wound check following his Mohs procedure on the R nasal ala. Patient reports the wound is healing well and is has become less painful over time.      1 month s/p Mohs to right nasal  ala with cartilage graft repair from left ear.  Patient has no concerns with pain, bleeding, drainage, swelling or redness.      Labs Reviewed:  N/A    Physical Exam:  Vitals: There were no vitals taken for this visit.  SKIN: Focused examination of face and left ear was performed.  - Left ear cartilage donor site: skin edges opposed, focal erosion and hemorraghic crust.  - Right side of nose with surgical ulcer, with cartilage graft visible in base. Reepithelization present at the periphery. Hemorraghic crust present  - No other lesions of concern on areas examined.     Medications:  Current Outpatient Medications   Medication     atenolol (TENORMIN) 50 MG tablet     atorvastatin (LIPITOR) 10 MG tablet     betamethasone valerate (VALISONE) 0.1 % ointment     ciprofloxacin-dexamethasone (CIPRODEX) otic suspension     clobetasol (TEMOVATE) 0.05 % external cream     gabapentin (NEURONTIN) 300 MG capsule     losartan (COZAAR) 100 MG tablet     NASAL SPRAY SALINE NA     PARoxetine (PAXIL) 20 MG tablet     pravastatin (PRAVACHOL) 40 MG tablet     sildenafil (VIAGRA) 50 MG tablet     tacrolimus (PROTOPIC) 0.1 % external ointment     tamsulosin (FLOMAX) 0.4 MG capsule     No current facility-administered medications for this visit.      Past Medical History:   Patient Active Problem List   Diagnosis     Otorrhea     Perforated ear drum     Past Medical History:   Diagnosis Date     Anxiety disorder      Crohn's colitis (H)      Headache(784.0)      Hearing loss      Meniere's disease      Noise effect on both inner ears      DEEDEE (obstructive sleep apnea)      Ringing in the ears      Sleep disorder      Snoring      Weight gain        Again, thank you for allowing me to participate in the care of your patient.        Sincerely,        Adrian Crowell MD

## 2023-06-08 NOTE — NURSING NOTE
Sabiha Pham's goals for this visit include:   Chief Complaint   Patient presents with     Wound Check     1 month s/p Mohs to right nasal ala with cartilage graft repair from left ear.  Patient has no concerns with pain, bleeding, drainage, swelling or redness.       He requests these members of his care team be copied on today's visit information: n/a    PCP: No Ref-Primary, Physician    Referring Provider:  No referring provider defined for this encounter.    There were no vitals taken for this visit.    Do you need any medication refills at today's visit? No  Mami Coppola RN

## 2023-06-12 ENCOUNTER — TELEPHONE (OUTPATIENT)
Dept: DERMATOLOGY | Facility: CLINIC | Age: 78
End: 2023-06-12
Payer: MEDICARE

## 2023-06-12 NOTE — TELEPHONE ENCOUNTER
Health Call Center    Phone Message    May a detailed message be left on voicemail: yes     Reason for Call: Other: Patient needs to be in [place] at 11:45 on 06/29/2023. He has a follow up appt with Dr. Crowell at  11 the same day. Can he come in earlier that day or later in the afternoon? Please call back [phone] Thank you     Action Taken: Message routed to:  Adult Clinics: Dermatology p [id number]    Travel Screening: Not Applicable

## 2023-06-12 NOTE — TELEPHONE ENCOUNTER
Pt called and appointment rescheduled for the following week.    Gloria Morfin RN on 6/12/2023 at 1:58 PM

## 2023-06-23 NOTE — PROGRESS NOTES
[facility] Dermatology Note  Encounter Date: Jun 8, 2023  Office Visit     Dermatology Problem List:  1. NMSC  - BCC, R ala nasi, s/p Mohs and cartilage graft 5/8/23  2. Vitiligo  3. Hx of Actinic keratoses, s/p cryotherapy  ____________________________________________    Assessment & Plan:    # BCC, R ala nasi; s/p Mohs scheduled 5/8/23  - Wound on the right side of nose Healing, but centrally cartilage still present in the bottom of the ulcer.   - Encouraged more frequent petroleum gel application and occlusion.   - Continue routine wound care.    Procedures Performed:   None.    Follow-up: 2 week(s) in-person, or earlier for new or changing lesions    Staff and Scribe:     Scribe Disclosure:  I, Valery Turner, am serving as a scribe to document services personally performed by Adrian Crowell DO based on data collection and the provider's statements to me.      Staff attestation:  The documentation recorded by the scribe accurately reflects the services I personally performed and the decisions I personally made. I have made edits where needed.    Adrian Crowell DO    Department of Dermatology  [facility]: Phone: [phone], Fax:[phone]  [facility]: Phone: [phone], Fax: [phone]      ____________________________________________    CC: Wound Check   HPI:  Mr. Sabiha Pham is a(n) 77 year old male who presents today as a return patient for wound check following his Mohs procedure on the R nasal ala. Patient reports the wound is healing well and is has become less painful over time.      1 month s/p Mohs to right nasal ala with cartilage graft repair from left ear.  Patient has no concerns with pain, bleeding, drainage, swelling or redness.      Labs Reviewed:  N/A    Physical Exam:  Vitals: There were no vitals taken for this visit.  SKIN: Focused examination of  face and left ear was performed.  - Left ear cartilage donor site: skin edges opposed, focal erosion and hemorraghic crust.  - Right side of nose with surgical ulcer, with cartilage graft visible in base. Reepithelization present at the periphery. Hemorraghic crust present  - No other lesions of concern on areas examined.     Medications:  Current Outpatient Medications   Medication     atenolol (TENORMIN) 50 MG tablet     atorvastatin (LIPITOR) 10 MG tablet     betamethasone valerate (VALISONE) 0.1 % ointment     ciprofloxacin-dexamethasone (CIPRODEX) otic suspension     clobetasol (TEMOVATE) 0.05 % external cream     gabapentin (NEURONTIN) 300 MG capsule     losartan (COZAAR) 100 MG tablet     NASAL SPRAY SALINE NA     PARoxetine (PAXIL) 20 MG tablet     pravastatin (PRAVACHOL) 40 MG tablet     sildenafil (VIAGRA) 50 MG tablet     tacrolimus (PROTOPIC) 0.1 % external ointment     tamsulosin (FLOMAX) 0.4 MG capsule     No current facility-administered medications for this visit.      Past Medical History:   Patient Active Problem List   Diagnosis     Otorrhea     Perforated ear drum     Past Medical History:   Diagnosis Date     Anxiety disorder      Crohn's colitis (H)      Headache(784.0)      Hearing loss      Meniere's disease      Noise effect on both inner ears      DEEDEE (obstructive sleep apnea)      Ringing in the ears      Sleep disorder      Snoring      Weight gain

## 2023-07-05 ENCOUNTER — OFFICE VISIT (OUTPATIENT)
Dept: DERMATOLOGY | Facility: CLINIC | Age: 78
End: 2023-07-05
Payer: COMMERCIAL

## 2023-07-05 DIAGNOSIS — Z85.828 HISTORY OF NONMELANOMA SKIN CANCER: ICD-10-CM

## 2023-07-05 DIAGNOSIS — L90.5 SCAR OF NOSE: ICD-10-CM

## 2023-07-05 DIAGNOSIS — Z09 POSTOP CHECK: Primary | ICD-10-CM

## 2023-07-05 PROCEDURE — 99024 POSTOP FOLLOW-UP VISIT: CPT | Mod: GC | Performed by: DERMATOLOGY

## 2023-07-05 ASSESSMENT — PAIN SCALES - GENERAL: PAINLEVEL: NO PAIN (0)

## 2023-07-05 NOTE — NURSING NOTE
Sabiha Pham's chief complaint for this visit includes:  Chief Complaint   Patient presents with     Surgical Followup     2 month wound check S/p Mohs right nasal ala, cartilage graft taken from left antihelix DOS: 5/8/2023     PCP: No Ref-Primary, Physician    Referring Provider:  No referring provider defined for this encounter.    There were no vitals taken for this visit.  No Pain (0)        Allergies   Allergen Reactions     Seasonal Allergies          Do you need any medication refills at today's visit? No    Marlen Rios CMA

## 2023-07-05 NOTE — LETTER
7/5/2023         RE: Sabiha Pham  [address]  [place] [address]        Dear Colleague,    Thank you for referring your patient, Sabiha Pham, to the [facility]. Please see a copy of my visit note below.    Dermatologic Surgery Clinic Note    Jul 5, 2023    Dermatology Problem List:  1. NMSC  - BCC, R ala nasi, s/p Mohs and cartilage graft 5/8/23  2. [facility])  3. Hx of Actinic keratoses, s/p cryotherapy    Subjective: The patient is a 77 year old man who presents today for a wound check after recent dermatologic surgery. No concerns for infection today. The patient continues with daily wound cares as recommended. No other associated symptoms, modifying factors, or prior treatments, except when noted above. The patient denies any constitutional symptoms, lymphadenopathy, unintentional weight loss or decreased appetite. No other skin concerns today.    Objective:   Gen: This is a well appearing individual in no acute distress. The patient is alert and oriented x 3.  An exam of the nose was performed today and visualized the following:  - The surgical site noted above is clean, dry, and intact. There is no surrounding erythema, purulence, or significant tenderness to palpation. No clinical evidence of infection noted today.    Assessment and Plan:     # Hx BCC, R ala nasi, s/p Mohs and cartilage graft 5/8/23  Mohs site has healed very well. No evidence of infection on examination today. Patient expresses interest in following with [place] derm surgery clinic although [facility]   - Return 3 months for scar evaluation   - Recommend regular skin exams     The patient was discussed with and evaluated by attending physician, DO. Rory Moncada MD  Dermatology Resident (PGY-4)    Staff Physician Comments:   I saw and evaluated the patient with the dermatology resident (Dr. Rory Rosas) and I agree  with the assessment and plan. I was present for the entire exam.    Adrian Crowell DO    Department of Dermatology  [facility]: Phone: [phone], Fax:[phone]  [facility]: Phone: [phone], Fax: [phone]      Again, thank you for allowing me to participate in the care of your patient.        Sincerely,        Adrian Crowell MD

## 2023-07-05 NOTE — PROGRESS NOTES
Dermatologic Surgery Clinic Note    Jul 5, 2023    Dermatology Problem List:  1. NMSC  - BCC, R ala nasi, s/p Mohs and cartilage graft 5/8/23  2. [facility]iligo ([facility])  3. Hx of Actinic keratoses, s/p cryotherapy    Subjective: The patient is a 77 year old man who presents today for a wound check after recent dermatologic surgery. No concerns for infection today. The patient continues with daily wound cares as recommended. No other associated symptoms, modifying factors, or prior treatments, except when noted above. The patient denies any constitutional symptoms, lymphadenopathy, unintentional weight loss or decreased appetite. No other skin concerns today.    Objective:   Gen: This is a well appearing individual in no acute distress. The patient is alert and oriented x 3.  An exam of the nose was performed today and visualized the following:  - The surgical site noted above is clean, dry, and intact. There is no surrounding erythema, purulence, or significant tenderness to palpation. No clinical evidence of infection noted today.    Assessment and Plan:     # Hx BCC, R ala nasi, s/p Mohs and cartilage graft 5/8/23  Mohs site has healed very well. No evidence of infection on examination today. Patient expresses interest in following with [place] derm surgery clinic although [facility]   - Return 3 months for scar evaluation   - Recommend regular skin exams     The patient was discussed with and evaluated by attending physician, Adrian Crowell DO.    Rory Rosas MD  Dermatology Resident (PGY-4)    Staff Physician Comments:   I saw and evaluated the patient with the dermatology resident (Dr. Rory Rosas) and I agree with the assessment and plan. I was present for the entire exam.    Adrian Crowell DO    Department of Dermatology  [facility]: Phone: [phone],  Fax:[phone]  [facility]: Phone: [phone], Fax: [phone]

## 2023-10-05 ENCOUNTER — OFFICE VISIT (OUTPATIENT)
Dept: DERMATOLOGY | Facility: CLINIC | Age: 78
End: 2023-10-05
Payer: COMMERCIAL

## 2023-10-05 DIAGNOSIS — Z85.828 HISTORY OF BASAL CELL CARCINOMA OF SKIN: ICD-10-CM

## 2023-10-05 DIAGNOSIS — L90.5 SCAR: Primary | ICD-10-CM

## 2023-10-05 PROCEDURE — 99024 POSTOP FOLLOW-UP VISIT: CPT | Mod: GC | Performed by: DERMATOLOGY

## 2023-10-05 NOTE — PROGRESS NOTES
Dermatologic Surgery Post-Op Scar Check     CC: Scar Management (Scar eval - Hx BCC, R ala nasi, s/p Mohs and cartilage graft 5/8/23)      Dermatology Problem List:  1. NMSC  - BCC, R ala nasi, s/p Mohs and cartilage graft 5/8/23  2. [facility])  3. Hx of Actinic keratoses, s/p cryotherapy    Subjective: Sabiha Pham is a 78 year old male who presents today for scar evaluation after BCC, R ala nasi, s/p Mohs and cartilage graft 5/8/23.   - feels like it overall has healed well. Has noticed a little pink bump forming there more recently  - denies pain  - had numbness for a while after the procedure but feels like the sensation has finally come back in the area   - no other concerns today    Objective: An exam of the nose was performed today   - well-healed scar on the R upper ala. Mild pink hypertrophy of the scar. Subtle notch in the alar rim.      Assessment and Plan:     1. BCC, R ala nasi, s/p Mohs and cartilage graft 5/8/23  - Surgical site healed well. Some scar hypertrophy. Discussed options including ILK vs scar massage. Patient decided he would like to try scar massage for now and follow-up for possible ILK if not resolved at that visit.     RTC in 3 months for scar evaluation and skin check.     Patient was discussed with and evaluated by attending physician Dr. Crowell.    Bozena Price MD  PGY-5, Micrographic Surgery and Dermatologic Oncology (MSDO) Fellow    Staff Physician Comments:   I saw and evaluated the patient with the Mohs Surgery Fellow (Dr. Bozena Price) and I agree with the assessment and plan. I was present for the entire exam.     Adrian Crowell DO    Department of Dermatology  [facility]: Phone: [phone], Fax:[phone]  [facility]: Phone: [phone], Fax: [phone]

## 2023-10-05 NOTE — NURSING NOTE
Sabiha Pham's goals for this visit include:   Chief Complaint   Patient presents with    Scar Management     Scar eval - Hx BCC, R eriberto coronel, s/p Mohs and cartilage graft 5/8/23       He requests these members of his care team be copied on today's visit information: n/a    PCP: No Ref-Primary, Physician    Referring Provider:  No referring provider defined for this encounter.    There were no vitals taken for this visit.    Do you need any medication refills at today's visit? No  Mami Coppola RN

## 2023-10-05 NOTE — LETTER
10/5/2023         RE: Sabiha Pham  [address]  [place] [facility] [address]        Dear Colleague,    Thank you for referring your patient, Sabiha Pham, to the [facility]. Please see a copy of my visit note below.    Dermatologic Surgery Post-Op Scar Check     CC: Scar Management (Scar eval - Hx BCC, R ala nasi, s/p Mohs and cartilage graft 5/8/23)      Dermatology Problem List:  1. NMSC  - BCC, R ala nasi, s/p Mohs and cartilage graft 5/8/23  2. [facility])  3. Hx of Actinic keratoses, s/p cryotherapy    Subjective: Sabiha Pham is a 78 year old male who presents today for scar evaluation after BCC, R ala nasi, s/p Mohs and cartilage graft 5/8/23.   - feels like it overall has healed well. Has noticed a little pink bump forming there more recently  - denies pain  - had numbness for a while after the procedure but feels like the sensation has finally come back in the area   - no other concerns today    Objective: An exam of the nose was performed today   - well-healed scar on the R upper ala. Mild pink hypertrophy of the scar. Subtle notch in the alar rim.      Assessment and Plan:     1. BCC, R ala nasi, s/p Mohs and cartilage graft 5/8/23  - Surgical site healed well. Some scar hypertrophy. Discussed options including ILK vs scar massage. Patient decided he would like to try scar massage for now and follow-up for possible ILK if not resolved at that visit.     RTC in 3 months for scar evaluation and skin check.     Patient was discussed with and evaluated by attending physician Dr. Crowell.    Bozena Price MD  PGY-5, Micrographic Surgery and Dermatologic Oncology (MSDO) Fellow    Staff Physician Comments:   I saw and evaluated the patient with the Mohs Surgery Fellow (Dr. Bozena Price) and I agree with the assessment and plan. I was present for the entire exam.     Adrian Crowell DO    Department of  Dermatology  [facility] [place]note 2]: Phone: [phone], Fax:[phone]  [facility]: Phone: [phone], Fax: [phone]            Again, thank you for allowing me to participate in the care of your patient.        Sincerely,        Adrian Crowell MD

## 2024-01-11 ENCOUNTER — TELEPHONE (OUTPATIENT)
Dept: DERMATOLOGY | Facility: CLINIC | Age: 79
End: 2024-01-11

## 2024-01-11 ENCOUNTER — OFFICE VISIT (OUTPATIENT)
Dept: DERMATOLOGY | Facility: CLINIC | Age: 79
End: 2024-01-11
Payer: MEDICARE

## 2024-01-11 DIAGNOSIS — L71.9 ROSACEA: ICD-10-CM

## 2024-01-11 DIAGNOSIS — L82.1 SEBORRHEIC KERATOSIS: ICD-10-CM

## 2024-01-11 DIAGNOSIS — L28.0 LICHEN SIMPLEX CHRONICUS: ICD-10-CM

## 2024-01-11 DIAGNOSIS — Z85.828 HISTORY OF NONMELANOMA SKIN CANCER: Primary | ICD-10-CM

## 2024-01-11 DIAGNOSIS — D48.9 NEOPLASM OF UNCERTAIN BEHAVIOR: ICD-10-CM

## 2024-01-11 DIAGNOSIS — L80 VITILIGO: ICD-10-CM

## 2024-01-11 DIAGNOSIS — L57.0 ACTINIC KERATOSIS: ICD-10-CM

## 2024-01-11 PROCEDURE — 11102 TANGNTL BX SKIN SINGLE LES: CPT | Performed by: DERMATOLOGY

## 2024-01-11 PROCEDURE — 88305 TISSUE EXAM BY PATHOLOGIST: CPT | Performed by: DERMATOLOGY

## 2024-01-11 PROCEDURE — 99213 OFFICE O/P EST LOW 20 MIN: CPT | Mod: 25 | Performed by: DERMATOLOGY

## 2024-01-11 NOTE — LETTER
1/11/2024         RE: Sabiha Pham  [address]  [facility] [address]        Dear Colleague,    Thank you for referring your patient, Sabiha Pham, to the [facility]. Please see a copy of my visit note below.    [facility] Dermatology Note  Encounter Date: Jan 11, 2024  Office Visit     Dermatology Problem List:  0. NUB, right posterior shoulder, s/p shave biopsy and ED&C performed on 1/11/2024    1. NMSC  - BCC, R ala nasi, s/p Mohs and cartilage graft 5/8/23  2. [facility])  3. Hx of Actinic keratoses, s/p cryotherapy  ____________________________________________    Assessment & Plan:    # Vitiligo   - [facility] treatments for home light unit.     # Lichen simplex chronicus, right anterior thigh   - reviewed importance of not scratching at area  - for pruritus: can apply OTC hydrocortisone to affected area twice daily for 14-21 days at a time.     # Well healed scar, right ala nasi   - well healed scar. Anterior aspect of scar there is a faint, erythematous papule which appears most consistent with rosacea. Will continue with observation. Photograph obtained today.      # Neoplasm of unspecified behavior of the skin (D49.2) on the right posterior shoulder. The differential diagnosis includes superficial basal cell carcinoma vs. other.   - Shave biopsy and ED&C performed today (see procedure note(s) below).    # AK's, scalp. Previously was treating with 5FU-C, though patient stopped.  - Discussed the risks and benefits of MELVINA light therapy today. Patient will schedule this at his earliest convenience.     # History of NMSC.   - ABCDEs: Counseled ABCDEs of melanoma: Asymmetry, Border (irregularity), Color (not uniform, changes in color), Diameter (greater than 6 mm which is about the size of a pencil eraser), and  Evolving (any changes in preexisting moles).  - Sun protection: Counseled SPF30+ sunscreen, UPF clothing, sun avoidance, tanning bed avoidance.   - Recommended skin checks every 6 months.     Procedures Performed:   - Shave biopsy and ED&C procedure note, location(s): right posterior shoulder. After discussion of benefits and risks including but not limited to bleeding, infection, scar, incomplete removal, recurrence, and non-diagnostic biopsy, written consent and photographs were obtained. The area was cleaned with isopropyl alcohol. 0.5mL of 1% lidocaine with epinephrine was injected to obtain adequate anesthesia of lesion(s). Shave biopsy at site(s) performed. After the shave biopsy, three cycles of ED&C performed. Petrolatum ointment and a sterile dressing were applied. The patient tolerated the procedure and no complications were noted. The patient was provided with verbal and written post care instructions.     Follow-up: 6 month(s) in-person, or earlier for new or changing lesions    Staff and Scribe:     Scribe Disclosure:   I, PARUL ZHAO, am serving as a scribe; to document services personally performed by Adrian Crowell MD -based on data collection and the provider's statements to me.    Patient was discussed with and evaluated by attending physician Dr. Crowell.     Iirs WHITLEYC     Staff Physician Comments:   I saw and evaluated the patient with the physician assistant (ROSA Wyman) and I agree with the assessment and plan and the above description of the procedure as documented by the scribe. I was present for the key portions of the procedure and entire exam.    Adrian Crowell DO    Department of Dermatology  [facility]: Phone: [phone], Fax:[phone]  [facility]: Phone: [phone], Fax:  "[phone]    ____________________________________________    CC: Skin Check (Full body skin check.  No areas of concern.  Undergoing UVB phototherapy) and Scar Management (Scar eval nose s/p Mohs.  Patient reports a \"bulge\" on right nasal sidewall.)    HPI:  Mr. Sabiha Pham is a(n) 78 year old male who presents today as a return patient for FBSE.    - Vitiligo has previously been well controlled with phototherapy. He [facility] and is wondering about getting more sessions loaded on the unit for continued treatment. He has not done phototherapy for about one month.   - Would like Mohs site evaluated. He has noticed a papule at the anterior aspect of the scar and would like to make sure this is not a recurrence. Site continues to be asymptomatic.     Patient is otherwise feeling well, without additional skin concerns.    Labs Reviewed:  N/A    Physical Exam:  Vitals: There were no vitals taken for this visit.  SKIN: Total skin excluding the undergarment areas was performed. The exam included the head/face, neck, both arms, chest, back, abdomen, both legs, digits and/or nails.   - involving the right anterior thigh is a hyperpigment, lichenified plaque.   - There are erythematous macules with overyling adherent scale on the scalp.   - Involving the back is an erythematous papule with concerning features on dermoscopy.    - Involving the chest and scalp are depigmented patches.   - There are waxy stuck on tan to brown papules on the trunk and extremities.   - There is no erythema, telangectasias, nodularity, or pigmentation on the site of prior NMSC. Involving the anterior aspect of the scar is an erythematous papule.   - No other lesions of concern on areas examined.     Medications:  Current Outpatient Medications   Medication     atenolol (TENORMIN) 50 MG tablet     atorvastatin (LIPITOR) 10 MG tablet     betamethasone valerate (VALISONE) 0.1 % ointment     " ciprofloxacin-dexamethasone (CIPRODEX) otic suspension     clobetasol (TEMOVATE) 0.05 % external cream     gabapentin (NEURONTIN) 300 MG capsule     losartan (COZAAR) 100 MG tablet     NASAL SPRAY SALINE NA     PARoxetine (PAXIL) 20 MG tablet     pravastatin (PRAVACHOL) 40 MG tablet     sildenafil (VIAGRA) 50 MG tablet     tacrolimus (PROTOPIC) 0.1 % external ointment     tamsulosin (FLOMAX) 0.4 MG capsule     No current facility-administered medications for this visit.      Past Medical History:   Patient Active Problem List   Diagnosis     Otorrhea     Perforated ear drum     Past Medical History:   Diagnosis Date     Anxiety disorder      Crohn's colitis (H)      Headache(784.0)      Hearing loss      Meniere's disease      Noise effect on both inner ears      DEEDEE (obstructive sleep apnea)      Ringing in the ears      Sleep disorder      Snoring      Weight gain           Duplicate note opened in error.       Again, thank you for allowing me to participate in the care of your patient.        Sincerely,        Adrian Crowell MD

## 2024-01-11 NOTE — NURSING NOTE
"Sabiha Pham's goals for this visit include:   Chief Complaint   Patient presents with    Skin Check     Full body skin check.  No areas of concern.  Undergoing UVB phototherapy    Scar Management     Scar eval nose s/p Mohs.  Patient reports a \"bulge\" on right nasal sidewall.       He requests these members of his care team be copied on today's visit information: n/a    PCP: No Ref-Primary, Physician    Referring Provider:  No referring provider defined for this encounter.    There were no vitals taken for this visit.    Do you need any medication refills at today's visit? No  Mami Coppola RN      "

## 2024-01-11 NOTE — Clinical Note
Hi Everyone,  At this patient's skin exam we biopsied a superficial BCC on his R posterior shoulder. Immediately after the biopsy, the wound was treated with ED&C. It looks like the ED&C slipped past Dr Price when she recommended excision. Please contact the patient to let him know the spot has already been treated (at the time of the biopsy), ok to cancel his excision appointment. Thank you.

## 2024-01-11 NOTE — TELEPHONE ENCOUNTER
Financial Counselor Review:    Procedure to be performed (include CPT code):  Photodynamic Therapy - 83931  Diagnosis:  actinic keratoses  ICD-10 code:  L57.0  # of treatments requested:  3  Medication order (include J code):  aminolevulonic acid (Levulan) -   Medication dose and frequency: 1-2 Levulan sticks per treatment    Include coverage and patient financial responsibility information:  Yes    Does patient need to be contacted by Financial Counselor:  No    Route response back to the dermatology patient care [place] - [id number]

## 2024-01-11 NOTE — PATIENT INSTRUCTIONS
Photodynamic Therapy (PDT) Home Care Instructions  I will experience redness, swelling, pain and discomfort which may last 5-10 days. I may experience pinkness or redness that persists for 2-3 weeks but longer duration is possible in some individuals. Risks are infection, eye injury, blistering, reactivation of the cold sore virus, skin lightening, skin darkening or scarring. Multiple treatments may be required.   DAY OF TREATMENT  Wash treated area with mild cleanser.  Redness of the treated skin is expected and can vary significantly from person to person and treatment to treatment.  Apply SPF 50 before leaving your home/office and while driving to and from work.  Remain indoors if possible and avoid direct as well as indirect sunlight.  If your head or face were treated, wear a wide brimmed hat while going to and from your car.  If your hands/arms were treated, were long sleeves and gloves.  Ice packs every 1-2 hours may be helpful as well.  In the evening, cleanse treated area gently.  Your skin will feel dry; keep treated area moisturized with a moisturizer.  If you have a history of cold sores, take (1) Valtrex 500mg tablet before bedtime, which will be prescribed by your physician.  DAY TWO  If you have a history of cold sores, take (1) Valtrex 500 mg tablets in the morning and in the evening on days 2 and 3.  You may take a shower.  Men should NOT shave if their face was treated.  Cleanse treated area gently.  Apply SPF 50  Most discomfort usually subsides by Day 3.  You should avoid direct sunlight and try to remain indoors on Day 2.  The sensitivity to sunlight will significantly decrease after 48 hours.  You should soak the treated areas with as soft washcloth with cold water for 20 minutes every 4-6 hours.  Ice may be applied after the cold-water soaks, if this feels good.  The area should be patted dry and moisturized following the cold-water soaks.  Follow evening routine as you did on Day 1  DAYS 3- 7    Try to avoid direct sunlight and minimize incidental exposure for one week.  NO BEACHES!  Continue using SPF 50.  This is very important in protecting your newly rejuvenated skin.  You may begin applying make-up once any crusting has healed.  The area may be slightly red for a few weeks.  The skin will feel dry and tightened.  Moisturize once to twice daily.  Who should I call with questions?  [facility]: [phone]  [facility]: [phone]  For urgent needs outside of business hours call the [facility] at [phone] and ask for the dermatology resident on call           Wound Care:  Electrodesiccation and Curettage (ED&C)    I will experience scar, altered skin color, bleeding, swelling, pain, crusting and redness. I may experience altered sensation. Risks are excessive bleeding, infection, muscle weakness, thick (hypertrophic or keloidal) scar, and recurrence. A second procedure may be recommended to obtain the best cosmetic or functional result.    What is electrodesiccation and curettage?  Scraping off tissue (curettage)  Destroy tissue using electric current or cautery (electrodessication)    How do I perform wound care?  Keep dressing in place for 24 hours.  Remove prior to showering  Wash gently with mild soap and water.  Pat dry  Put on a thick layer of Vaseline on the wound using a cotton-swab   Cover the wound with a bandage to protect from dust and tight clothing  Perform wound care once daily until the center of the wound has a pinked over appearance  During wound care, do not allow the area to dry out or form a scab  **If a build up of crust develops, soak a cotton swab in hydrogen peroxide to remove the crust    What do I need?  **Hydrogen peroxide    Cotton-swabs   Vaseline or petroleum jelly   Band-AidsTM or dressing supplies as needed     When should I call the doctor?  [facility]:  [phone]  [facility]: [phone]  For urgent needs outside of business hours call the [facility] at [phone] and ask for the dermatology resident on call

## 2024-01-11 NOTE — PROGRESS NOTES
[facility] Dermatology Note  Encounter Date: Jan 11, 2024  Office Visit     Dermatology Problem List:  0. NUB, right posterior shoulder, s/p shave biopsy and ED&C performed on 1/11/2024    1. NMSC  - BCC, R ala nasi, s/p Mohs and cartilage graft 5/8/23  2. [facility])  3. Hx of Actinic keratoses, s/p cryotherapy  ____________________________________________    Assessment & Plan:    # Vitiligo   - [facility].   - He has a home phototherapy unit, but has run out of treatment codes. Unfortunately, [facility] unit treatments for home light unit.     # Lichen simplex chronicus, right anterior thigh   - reviewed importance of not scratching at area  - for pruritus: can apply OTC hydrocortisone to affected area twice daily for 14-21 days at a time.     # Well healed scar, right ala nasi   - well healed scar. Anterior aspect of scar there is a faint, erythematous papule which appears most consistent with rosacea. Will continue with observation. Photograph obtained today.      # Neoplasm of unspecified behavior of the skin (D49.2) on the right posterior shoulder. The differential diagnosis includes superficial basal cell carcinoma vs. other.   - Shave biopsy and ED&C performed today (see procedure note(s) below).    # AK's, scalp. Previously was treating with 5FU-C, though patient stopped.  - Discussed the risks and benefits of MELVINA light therapy today. Patient will schedule this at his earliest convenience.     # History of NMSC.   - ABCDEs: Counseled ABCDEs of melanoma: Asymmetry, Border (irregularity), Color (not uniform, changes in color), Diameter (greater than 6 mm which is about the size of a pencil eraser), and Evolving (any changes in preexisting moles).  - Sun protection: Counseled SPF30+ sunscreen, UPF clothing, sun avoidance, tanning bed avoidance.   - Recommended skin checks every  "6 months.     Procedures Performed:   - Shave biopsy and ED&C procedure note, location(s): right posterior shoulder. After discussion of benefits and risks including but not limited to bleeding, infection, scar, incomplete removal, recurrence, and non-diagnostic biopsy, written consent and photographs were obtained. The area was cleaned with isopropyl alcohol. 0.5mL of 1% lidocaine with epinephrine was injected to obtain adequate anesthesia of lesion(s). Shave biopsy at site(s) performed. After the shave biopsy, three cycles of ED&C performed. Petrolatum ointment and a sterile dressing were applied. The patient tolerated the procedure and no complications were noted. The patient was provided with verbal and written post care instructions.     Follow-up: 6 month(s) in-person, or earlier for new or changing lesions    Staff and Scribe:     Scribe Disclosure:   I, PARUL ZHAO, am serving as a scribe; to document services personally performed by Adrian Crowell MD -based on data collection and the provider's statements to me.    Patient was discussed with and evaluated by attending physician Dr. Crowell.     Iris LEE-MARK     Staff Physician Comments:   I saw and evaluated the patient with the physician assistant (ROSA Wyman) and I agree with the assessment and plan and the above description of the procedure as documented by the scribe. I was present for the key portions of the procedure and entire exam.    Adrian Crowell DO    Department of Dermatology  [facility]: Phone: [phone], Fax:[phone]  [facility]: Phone: [phone], Fax: [phone]    ____________________________________________    CC: Skin Check (Full body skin check.  No areas of concern.  Undergoing UVB phototherapy) and Scar Management (Scar eval nose s/p Mohs.  Patient reports a \"bulge\" on right nasal " sidewall.)    HPI:  Mr. Sabiha Pham is a(n) 78 year old male who presents today as a return patient for FBSE.    - Vitiligo has previously been well controlled with phototherapy. [facility] and is wondering about getting more sessions loaded on the unit for continued treatment. He has not done phototherapy for about one month.   - Would like Mohs site evaluated. He has noticed a papule at the anterior aspect of the scar and would like to make sure this is not a recurrence. Site continues to be asymptomatic.     Patient is otherwise feeling well, without additional skin concerns.    Labs Reviewed:  N/A    Physical Exam:  Vitals: There were no vitals taken for this visit.  SKIN: Total skin excluding the undergarment areas was performed. The exam included the head/face, neck, both arms, chest, back, abdomen, both legs, digits and/or nails.   - involving the right anterior thigh is a hyperpigment, lichenified plaque.   - There are erythematous macules with overyling adherent scale on the scalp.   - Involving the back is an erythematous papule with concerning features on dermoscopy.    - Involving the chest and scalp are depigmented patches.   - There are waxy stuck on tan to brown papules on the trunk and extremities.   - There is no erythema, telangectasias, nodularity, or pigmentation on the site of prior NMSC. Involving the anterior aspect of the scar is an erythematous papule.   - No other lesions of concern on areas examined.     Medications:  Current Outpatient Medications   Medication    atenolol (TENORMIN) 50 MG tablet    atorvastatin (LIPITOR) 10 MG tablet    betamethasone valerate (VALISONE) 0.1 % ointment    ciprofloxacin-dexamethasone (CIPRODEX) otic suspension    clobetasol (TEMOVATE) 0.05 % external cream    gabapentin (NEURONTIN) 300 MG capsule    losartan (COZAAR) 100 MG tablet    NASAL SPRAY SALINE NA    PARoxetine (PAXIL) 20 MG tablet    pravastatin  (PRAVACHOL) 40 MG tablet    sildenafil (VIAGRA) 50 MG tablet    tacrolimus (PROTOPIC) 0.1 % external ointment    tamsulosin (FLOMAX) 0.4 MG capsule     No current facility-administered medications for this visit.      Past Medical History:   Patient Active Problem List   Diagnosis    Otorrhea    Perforated ear drum     Past Medical History:   Diagnosis Date    Anxiety disorder     Crohn's colitis (H)     Headache(784.0)     Hearing loss     Meniere's disease     Noise effect on both inner ears     DEEDEE (obstructive sleep apnea)     Ringing in the ears     Sleep disorder     Snoring     Weight gain

## 2024-01-12 ENCOUNTER — MYC MEDICAL ADVICE (OUTPATIENT)
Dept: DERMATOLOGY | Facility: CLINIC | Age: 79
End: 2024-01-12

## 2024-01-12 NOTE — TELEPHONE ENCOUNTER
PB DOS: TBD  Type of Procedure: Photodynamic Therapy  Medication dose and frequency: 1-2 Levulan sticks per treatment  CPT Codes: 13785,   ICD10 Codes: L57.0    Surgeon/Ordering provider: Adrian Crowell MD  Pre-cert/Authorization completed: no PA required   Payer: Medicare and [facility]   Spoke to CMS and [employer] follows   Ref. # / Auth #   Valid Dates:     Please advise the patient to contact their insurance for coverage and benefits. Prior authorization is not a guarantee of payment. Payment is based on the patient's benefit plan documents at the time of service

## 2024-01-12 NOTE — TELEPHONE ENCOUNTER
[facility] to provide to insurance and to schedule treatment.    Gloria Morfin RN on 1/12/2024 at 2:51 PM

## 2024-01-15 LAB
PATH REPORT.COMMENTS IMP SPEC: ABNORMAL
PATH REPORT.COMMENTS IMP SPEC: ABNORMAL
PATH REPORT.COMMENTS IMP SPEC: YES
PATH REPORT.FINAL DX SPEC: ABNORMAL
PATH REPORT.GROSS SPEC: ABNORMAL
PATH REPORT.MICROSCOPIC SPEC OTHER STN: ABNORMAL
PATH REPORT.RELEVANT HX SPEC: ABNORMAL

## 2024-01-16 DIAGNOSIS — C44.612 BASAL CELL CARCINOMA (BCC) OF RIGHT SHOULDER: Primary | ICD-10-CM

## 2024-01-17 ENCOUNTER — TELEPHONE (OUTPATIENT)
Dept: DERMATOLOGY | Facility: CLINIC | Age: 79
End: 2024-01-17
Payer: MEDICARE

## 2024-01-17 NOTE — TELEPHONE ENCOUNTER
Excision/Mohs previsit information                                                    Diagnosis: basal cell carcinoma  Site(s): Right posterior shoulder     Over the counter Chlorhexidine surgical soap to wash all skin below the belly button twice before surgery should be recommended for the following:  - Surgical sites below the waist  - Immunosuppressed  - Previous surgical site infection  - Anticipated wound care challenges    Medication & Allergy Information                                                      Review and update allergy and medication list.    Do you take the following medications:  Coumadin, Eliquis, Pradaxa, Xarelto:  NO   -If on Coumadin, INR should be checked within 7 days of surgery.  Range should be 3.5 or less or within therapeutic range.    Past Medical History                                                    Do you currently or have you previously had any of the following conditions:    Hepatitis:  YES, 50 years ago from   HIV/AIDS:  NO  Prolonged bleeding or bleeding disorder:  NO  Pacemaker or Defibrillator:  NO.    History of artificial or heart valve replacement:  NO  Endocarditis (inflammation of the inner lining of the heart's chambers and valves):  NO  Have you ever had a prosthetic joint infection:  NO  Pregnant or Breastfeeding:  N/A  Mobility device (wheelchair, transfer difficulty): NO    Important Reminders:                                                      Ok to take all of their medications as prescribed  Patients can eat, no need to be fasting  Patient will not be able to get the site wet for 48 hrs  No submerging wound in standing water (lake, pool, bathtub, hot tub) for 2 weeks  No physical activity for 48 hrs (further restrictions will be discussed by MD at time of visit)    If any positives, send to RN for further review  Gloria Morfin RN     Writer called pt to discuss pathology results. Pt scheduled for excision. Excision checklist complete, pt  has a history of hepatitis, unsure of type from 50 years ago when he was in the  otherwise checklist was negative. Pt denied having any questions or concerns at this time.     Gloria Morfin RN on 1/17/2024 at 10:30 AM    Final Diagnosis  A. Right posterior shoulder:  - Basal cell carcinoma, superficial type - (see description)   Electronically signed by Jimy Moses MD on 1/15/2024 at 12:58 PM    Result Notes     Marlen Rios CMA  1/17/2024  8:55 AM CST Back to Top    Left message for patient to return call to clinic regarding pathology result and scheduling excision.  Patient is tentatively scheduled for 2/5/24 at 1:00 pm.   Bozena Price MD  1/16/2024  9:30 PM CST     Superficial BCC, needs excision. Please notify patient of result. Referral order placed so excision can be scheduled. Thanks!     Bozena Price MD

## 2024-01-31 ENCOUNTER — TELEPHONE (OUTPATIENT)
Dept: DERMATOLOGY | Facility: CLINIC | Age: 79
End: 2024-01-31
Payer: MEDICARE

## 2024-01-31 NOTE — TELEPHONE ENCOUNTER
"I left a message for patient to call [facility].    Per Dr. Crowell, \"At this patient's skin exam we biopsied a superficial BCC on his R posterior shoulder. Immediately after the biopsy, the wound was treated with ED&C. It looks like the ED&C slipped past Dr Price when she recommended excision. Please contact the patient to let him know the spot has already been treated (at the time of the biopsy), ok to cancel his excision appointment.\"    I canceled his excision appointment.  Mami Coppola RN    "

## 2024-01-31 NOTE — TELEPHONE ENCOUNTER
Patient called I read off what ulisses wrote and let him know his appointment got canceled.       Melani GALICIA

## 2024-02-06 NOTE — NURSING NOTE
Chief Complaint   Patient presents with     RECHECK     right tympanic perforation     Aj Ha LPN     No

## 2024-02-21 NOTE — TELEPHONE ENCOUNTER
Spoke with patient.  He was given CPT and ICD-10 codes for PDT.  He will reach out to his insurance and call us back to schedule once he knows what his coverage is.

## 2024-02-28 ENCOUNTER — OFFICE VISIT (OUTPATIENT)
Dept: DERMATOLOGY | Facility: CLINIC | Age: 79
End: 2024-02-28
Payer: MEDICARE

## 2024-02-28 DIAGNOSIS — L57.0 ACTINIC KERATOSIS: ICD-10-CM

## 2024-02-28 PROCEDURE — 96567 PDT DSTR PRMLG LES SKN: CPT | Performed by: DERMATOLOGY

## 2024-02-28 NOTE — PROGRESS NOTES
The patient presents for PDT actinic keratosis treatment.   See procedure note for details.     Adrian Crowell DO    Department of Dermatology  [facility]: Phone: [phone], Fax:[phone]  [facility]: Phone: [phone], Fax: [phone]

## 2024-02-28 NOTE — PROCEDURES
Photodynamic Therapy Intake:    If patient has a history of HSV or VZV (shingles in treatment area) they have been given prophylaxis or documented that they refused it:  NO  If Valtrex prescribed, when did patient start?   N/A.    1.Close monitoring for more significant reaction, and avoid saran wrap if YES to any of the following:  New medications since seen by physician? No (If yes, contact supervising physician)  NSAIDs, ibuprofen, aspirin, naproxen, piroxicam: No  Antiarrhythmics (amiodarone, quinidine): No  Hydrochlorothiazide: No    2. Will hold PDT for YES to any of the following:  Pregnancy/breastfeeding/unknown pregnancy: N/A  Sun burn: No  Tetracyclines such as doxycycline: No  Ciprofloxacin: No  Methotrexate: No    3. Will hold LEVULAN for YES to any of the following:   Treatment today is for acne: NO    4. If patient is receiving sand paper (RN only procedure) do they have a history of MRSA:  No    5. Consent within 90 days with MD verified in patient chart?  Yes  The sites to be treated were verified and discussed with patient. Sites verified were scalp.   Expected symptoms and/or complications of redness, peeling, stinging, and burning were reviewed.  Comfort measures including moisturizer, cool compresses, and sun protection were also reviewed with patient.  After review, patient verbalized understanding of procedure and consent form signed by patient (as per MD documentation) and patient agrees to proceed with treatment.     Treatment site(s) cleansed with Technicare: Yes   Treatment site(s) de-greased with Acetone: Yes  Applied 1 Levulan stick to the site/s.      MEDICATION: Levulan  DOSE: 1.5 mL  ROUTE: Topical  : [employer]  LOCATION GIVEN: scalp  LOT NO: [id number]  EXP. DATE: 09/2026  NDC: 75891-436-24    TREATMENT NUMBER (30 maximum treatments per site): 1    Photodynamic Therapy(PDT) Post Op Note    Patient successfully completed PDT treatment today. Patient tolerated  treatment without complication.  Sites were cleansed with mild soap and water and SPF 50 was applied after treatment.     Sun protection was reviewed with patient:  Patient brought personal hat    After care instructions were reviewed with patient. AVS printed with clinic contact information and given to patient. Patient verbalized understanding and had no further questions or concerns at this time. Patient left clinic in good condition.     Staff Physician Comments:   The RN saw and evaluated the patient (Maribel Spears RN).  I agree with the assessment and plan and description of the procedure as above.   I was immediately available at all times.     Adrian Crowell DO    Department of Dermatology  [facility]: Phone: [phone], Fax:[phone]  [facility]: Phone: [phone], Fax: [phone]

## 2024-02-28 NOTE — LETTER
2/28/2024         RE: Sabiha hPam  [address]  [facility] [place] [facility] [address]        Dear Colleague,    Thank you for referring your patient, Sabiha Pham, to the [facility]. Please see a copy of my visit note below.    The patient presents for PDT actinic keratosis treatment.   See procedure note for details.     Adrian Crowell DO    Department of Dermatology  [facility]: Phone: [phone], Fax:[phone]  [facility]: Phone: [phone], Fax: [phone]      Again, thank you for allowing me to participate in the care of your patient.        Sincerely,        Adrian Crowell MD

## 2024-02-28 NOTE — PATIENT INSTRUCTIONS
Photodynamic Therapy (PDT) Home Care Instructions  I will experience redness, swelling, pain and discomfort which may last 5-10 days. I may experience pinkness or redness that persists for 2-3 weeks but longer duration is possible in some individuals. Risks are infection, eye injury, blistering, reactivation of the cold sore virus, skin lightening, skin darkening or scarring. Multiple treatments may be required.   DAY OF TREATMENT  Wash treated area with mild cleanser.  Redness of the treated skin is expected and can vary significantly from person to person and treatment to treatment.  Apply SPF 50 before leaving your home/office and while driving to and from work.  Remain indoors if possible and avoid direct as well as indirect sunlight.  If your head or face were treated, wear a wide brimmed hat while going to and from your car.  If your hands/arms were treated, were long sleeves and gloves.  Ice packs every 1-2 hours may be helpful as well.  In the evening, cleanse treated area gently.  Your skin will feel dry; keep treated area moisturized with a moisturizer.  If you have a history of cold sores, take (1) Valtrex 500mg tablet before bedtime, which will be prescribed by your physician.  DAY TWO  If you have a history of cold sores, take (1) Valtrex 500 mg tablets in the morning and in the evening on days 2 and 3.  You may take a shower.  Men should NOT shave if their face was treated.  Cleanse treated area gently.  Apply SPF 50  Most discomfort usually subsides by Day 3.  You should avoid direct sunlight and try to remain indoors on Day 2.  The sensitivity to sunlight will significantly decrease after 48 hours.  You should soak the treated areas with as soft washcloth with cold water for 20 minutes every 4-6 hours.  Ice may be applied after the cold-water soaks, if this feels good.  The area should be patted dry and moisturized following the cold-water soaks.  Follow evening routine as you did on Day 1  DAYS 3- 7    Try to avoid direct sunlight and minimize incidental exposure for one week.  NO BEACHES!  Continue using SPF 50.  This is very important in protecting your newly rejuvenated skin.  You may begin applying make-up once any crusting has healed.  The area may be slightly red for a few weeks.  The skin will feel dry and tightened.  Moisturize once to twice daily.  Who should I call with questions?  [facility]: [phone]  [facility]: [phone]  For urgent needs outside of business hours call the [facility] at [phone] and ask for the dermatology resident on call

## 2024-03-26 NOTE — PATIENT INSTRUCTIONS
Photodynamic Therapy (PDT) Home Care Instructions  I will experience redness, swelling, pain and discomfort which may last 5-10 days. I may experience pinkness or redness that persists for 2-3 weeks but longer duration is possible in some individuals. Risks are infection, eye injury, blistering, reactivation of the cold sore virus, skin lightening, skin darkening or scarring. Multiple treatments may be required.   DAY OF TREATMENT  Wash treated area with mild cleanser.  Redness of the treated skin is expected and can vary significantly from person to person and treatment to treatment.  Apply SPF 50 before leaving your home/office and while driving to and from work.  Remain indoors if possible and avoid direct as well as indirect sunlight.  If your head or face were treated, wear a wide brimmed hat while going to and from your car.  If your hands/arms were treated, were long sleeves and gloves.  Ice packs every 1-2 hours may be helpful as well.  In the evening, cleanse treated area gently.  Your skin will feel dry; keep treated area moisturized with a moisturizer.  If you have a history of cold sores, take (1) Valtrex 500mg tablet before bedtime, which will be prescribed by your physician.  DAY TWO  If you have a history of cold sores, take (1) Valtrex 500 mg tablets in the morning and in the evening on days 2 and 3.  You may take a shower.  Men should NOT shave if their face was treated.  Cleanse treated area gently.  Apply SPF 50  Most discomfort usually subsides by Day 3.  You should avoid direct sunlight and try to remain indoors on Day 2.  The sensitivity to sunlight will significantly decrease after 48 hours.  You should soak the treated areas with as soft washcloth with cold water for 20 minutes every 4-6 hours.  Ice may be applied after the cold-water soaks, if this feels good.  The area should be patted dry and moisturized following the cold-water soaks.  Follow evening routine as you did on Day 1  DAYS 3- 7    Try to avoid direct sunlight and minimize incidental exposure for one week.  NO BEACHES!  Continue using SPF 50.  This is very important in protecting your newly rejuvenated skin.  You may begin applying make-up once any crusting has healed.  The area may be slightly red for a few weeks.  The skin will feel dry and tightened.  Moisturize once to twice daily.  Who should I call with questions?  [facility]: [phone]  [facility]: [phone]  For urgent needs outside of business hours call the [facility] at [phone] and ask for the dermatology resident on call

## 2024-03-26 NOTE — PROGRESS NOTES
Photodynamic Therapy Intake:    If patient has a history of HSV or VZV (shingles in treatment area) they have been given prophylaxis or documented that they refused it:  NO  If Valtrex prescribed, when did patient start?   N/A.    1.Close monitoring for more significant reaction, and avoid saran wrap if YES to any of the following:  New medications since seen by physician? No (If yes, contact supervising physician)  NSAIDs, ibuprofen, aspirin, naproxen, piroxicam: No  Antiarrhythmics (amiodarone, quinidine): No  Hydrochlorothiazide: No    2. Will hold PDT for YES to any of the following:  Pregnancy/breastfeeding/unknown pregnancy: N/A  Sun burn: No  Tetracyclines such as doxycycline: No  Ciprofloxacin: No  Methotrexate: No    3. Will hold LEVULAN for YES to any of the following:   Treatment today is for acne: NO    4. If patient is receiving sand paper (RN only procedure) do they have a history of MRSA:  No    5. Consent within 90 days with MD verified in patient chart?  Yes  The sites to be treated were verified and discussed with patient. Sites verified were scalp.   Expected symptoms and/or complications of redness, peeling, stinging, and burning were reviewed.  Comfort measures including moisturizer, cool compresses, and sun protection were also reviewed with patient.  After review, patient verbalized understanding of procedure and consent form signed by patient (as per MD documentation) and patient agrees to proceed with treatment.     Treatment site(s) cleansed with Technicare: Yes   Treatment site(s) de-greased with Acetone: Yes  Applied 1 Levulan stick to the site/s.      MEDICATION: Levulan  DOSE: 1.5 mL  ROUTE: Topical  : [employer]  LOCATION GIVEN: scalp  LOT NO: [id number]  EXP. DATE: 09/2026  NDC: 72875-231-52    TREATMENT NUMBER (30 maximum treatments per site): 2    Photodynamic Therapy(PDT) Post Op Note    Patient successfully completed PDT treatment today. Patient tolerated  treatment without complication.  Sites were cleansed with mild soap and water and SPF 50 was applied after treatment.     Sun protection was reviewed with patient:  Patient brought personal hat    After care instructions were reviewed with patient. AVS printed with clinic contact information and given to patient. Patient verbalized understanding and had no further questions or concerns at this time. Patient left clinic in good condition.     Staff Physician Comments:   The RN saw and evaluated the patient (Maribel Spears RN).  I agree with the assessment and plan and description of the procedure as above.   I was immediately available at all times.     Adrian Crowell DO    Department of Dermatology  [facility]: Phone: [phone], Fax:[phone]  [facility]: Phone: [phone], Fax: [phone]

## 2024-03-27 ENCOUNTER — OFFICE VISIT (OUTPATIENT)
Dept: DERMATOLOGY | Facility: CLINIC | Age: 79
End: 2024-03-27
Payer: MEDICARE

## 2024-03-27 DIAGNOSIS — L57.0 ACTINIC KERATOSIS: ICD-10-CM

## 2024-03-27 PROCEDURE — 96567 PDT DSTR PRMLG LES SKN: CPT | Performed by: DERMATOLOGY

## 2024-03-27 NOTE — PROCEDURES
Photodynamic Therapy Intake:    If patient has a history of HSV or VZV (shingles in treatment area) they have been given prophylaxis or documented that they refused it:  NO  If Valtrex prescribed, when did patient start?   N/A.    1.Close monitoring for more significant reaction, and avoid saran wrap if YES to any of the following:  New medications since seen by physician? No (If yes, contact supervising physician)  NSAIDs, ibuprofen, aspirin, naproxen, piroxicam: No  Antiarrhythmics (amiodarone, quinidine): No  Hydrochlorothiazide: No    2. Will hold PDT for YES to any of the following:  Pregnancy/breastfeeding/unknown pregnancy: N/A  Sun burn: No  Tetracyclines such as doxycycline: No  Ciprofloxacin: No  Methotrexate: No    3. Will hold LEVULAN for YES to any of the following:   Treatment today is for acne: NO    4. If patient is receiving sand paper (RN only procedure) do they have a history of MRSA:  No    5. Consent within 90 days with MD verified in patient chart?  Yes  The sites to be treated were verified and discussed with patient. Sites verified were scalp.   Expected symptoms and/or complications of redness, peeling, stinging, and burning were reviewed.  Comfort measures including moisturizer, cool compresses, and sun protection were also reviewed with patient.  After review, patient verbalized understanding of procedure and consent form signed by patient (as per MD documentation) and patient agrees to proceed with treatment.     Treatment site(s) cleansed with Technicare: Yes   Treatment site(s) de-greased with Acetone: Yes  Applied 1 Levulan stick to the site/s.      MEDICATION: Levulan  DOSE: 1.5 mL  ROUTE: Topical  : [employer]  LOCATION GIVEN: scalp  LOT NO: [id number]  EXP. DATE: 11/2026  NDC: 94616-157-61    TREATMENT NUMBER (30 maximum treatments per site): 2    Photodynamic Therapy(PDT) Post Op Note    Patient successfully completed PDT treatment today. Patient tolerated  treatment without complication.  Sites were cleansed with mild soap and water and SPF 50 was applied after treatment.     Sun protection was reviewed with patient:  Patient brought personal hat    After care instructions were reviewed with patient. AVS printed with clinic contact information and given to patient. Patient verbalized understanding and had no further questions or concerns at this time. Patient left clinic in good condition.     Staff Physician Comments:   The RN saw and evaluated the patient (Maribel Spears RN).  I agree with the assessment and plan and description of the procedure as above.   I was immediately available at all times.     Adrian Crowell DO    Department of Dermatology  [facility]: Phone: [phone], Fax:[phone]  [facility]: Phone: [phone], Fax: [phone]

## 2024-03-27 NOTE — LETTER
3/27/2024         RE: Sabiha Pham  [address]  [place] [address]        Dear Colleague,    Thank you for referring your patient, Sabiha Pham, to the [facility]. Please see a copy of my visit note below.    Photodynamic Therapy Intake:    If patient has a history of HSV or VZV (shingles in treatment area) they have been given prophylaxis or documented that they refused it:  NO  If Valtrex prescribed, when did patient start?   N/A.    1.Close monitoring for more significant reaction, and avoid saran wrap if YES to any of the following:  New medications since seen by physician? No (If yes, contact supervising physician)  NSAIDs, ibuprofen, aspirin, naproxen, piroxicam: No  Antiarrhythmics (amiodarone, quinidine): No  Hydrochlorothiazide: No    2. Will hold PDT for YES to any of the following:  Pregnancy/breastfeeding/unknown pregnancy: N/A  Sun burn: No  Tetracyclines such as doxycycline: No  Ciprofloxacin: No  Methotrexate: No    3. Will hold LEVULAN for YES to any of the following:   Treatment today is for acne: NO    4. If patient is receiving sand paper (RN only procedure) do they have a history of MRSA:  No    5. Consent within 90 days with MD verified in patient chart?  Yes  The sites to be treated were verified and discussed with patient. Sites verified were scalp.   Expected symptoms and/or complications of redness, peeling, stinging, and burning were reviewed.  Comfort measures including moisturizer, cool compresses, and sun protection were also reviewed with patient.  After review, patient verbalized understanding of procedure and consent form signed by patient (as per MD documentation) and patient agrees to proceed with treatment.     Treatment site(s) cleansed with Technicare: Yes   Treatment site(s) de-greased with Acetone: Yes  Applied 1 Levulan stick to the site/s.      MEDICATION: Levulan  DOSE: 1.5 mL  ROUTE: Topical  : [employer]  LOCATION GIVEN: scalp  LOT NO: [id number]  EXP. DATE: 09/2026  NDC: 42198-484-26    TREATMENT NUMBER (30 maximum treatments per site): 2    Photodynamic Therapy(PDT) Post Op Note    Patient successfully completed PDT treatment today. Patient tolerated treatment without complication.  Sites were cleansed with mild soap and water and SPF 50 was applied after treatment.     Sun protection was reviewed with patient:  Patient brought personal hat    After care instructions were reviewed with patient. AVS printed with clinic contact information and given to patient. Patient verbalized understanding and had no further questions or concerns at this time. Patient left clinic in good condition.     Staff Physician Comments:   The RN saw and evaluated the patient (Maribel Spears RN).  I agree with the assessment and plan and description of the procedure as above.   I was immediately available at all times.     Adrian Crowell DO    Department of Dermatology  [facility]: Phone: [phone], Fax:[phone]  [facility]: Phone: [phone], Fax: [phone]      Again, thank you for allowing me to participate in the care of your patient.        Sincerely,        Adrian Crowell MD

## 2024-04-23 NOTE — PROGRESS NOTES
Photodynamic Therapy Intake:    If patient has a history of HSV or VZV (shingles in treatment area) they have been given prophylaxis or documented that they refused it:  NO  If Valtrex prescribed, when did patient start?   N/A.    1.Close monitoring for more significant reaction, and avoid saran wrap if YES to any of the following:  New medications since seen by physician? No (If yes, contact supervising physician)  NSAIDs, ibuprofen, aspirin, naproxen, piroxicam: No  Antiarrhythmics (amiodarone, quinidine): No  Hydrochlorothiazide: No    2. Will hold PDT for YES to any of the following:  Pregnancy/breastfeeding/unknown pregnancy: N/A  Sun burn: No  Tetracyclines such as doxycycline: No  Ciprofloxacin: No  Methotrexate: No    3. Will hold LEVULAN for YES to any of the following:   Treatment today is for acne: NO    4. If patient is receiving sand paper (RN only procedure) do they have a history of MRSA:  No    5. Consent within 90 days with MD verified in patient chart?  Yes  The sites to be treated were verified and discussed with patient. Sites verified were scalp.   Expected symptoms and/or complications of redness, peeling, stinging, and burning were reviewed.  Comfort measures including moisturizer, cool compresses, and sun protection were also reviewed with patient.  After review, patient verbalized understanding of procedure and consent form signed by patient (as per MD documentation) and patient agrees to proceed with treatment.     Treatment site(s) cleansed with Hibiclens: Yes   Treatment site(s) de-greased with Acetone: Yes  Applied 1 Levulan stick to the site/s.      MEDICATION: Levulan  DOSE: 1.5 mL  ROUTE: Topical  : [employer]  LOCATION GIVEN: scalp  LOT NO: [id number]  EXP. DATE: 12/2026  NDC: 36284-623-60    TREATMENT NUMBER (30 maximum treatments per site): 3    Photodynamic Therapy(PDT) Post Op Note    Patient successfully completed PDT treatment today. Patient tolerated  treatment without complication.  Sites were cleansed with mild soap and water and SPF 50 was applied after treatment.     Sun protection was reviewed with patient:  Patient brought personal hat    After care instructions were reviewed with patient. AVS printed with clinic contact information and given to patient. Patient verbalized understanding and had no further questions or concerns at this time. Patient left clinic in good condition.     Staff Physician Comments:   The RN saw and evaluated the patient (Maribel Spears RN).  I agree with the assessment and plan and description of the procedure as above.   I was immediately available at all times.     Adrian Crowell DO    Department of Dermatology  [facility]: Phone: [phone], Fax:[phone]  [facility]: Phone: [phone], Fax: [phone]

## 2024-04-23 NOTE — PATIENT INSTRUCTIONS
Photodynamic Therapy (PDT) Home Care Instructions  I will experience redness, swelling, pain and discomfort which may last 5-10 days. I may experience pinkness or redness that persists for 2-3 weeks but longer duration is possible in some individuals. Risks are infection, eye injury, blistering, reactivation of the cold sore virus, skin lightening, skin darkening or scarring. Multiple treatments may be required.   DAY OF TREATMENT  Wash treated area with mild cleanser.  Redness of the treated skin is expected and can vary significantly from person to person and treatment to treatment.  Apply SPF 50 before leaving your home/office and while driving to and from work.  Remain indoors if possible and avoid direct as well as indirect sunlight.  If your head or face were treated, wear a wide brimmed hat while going to and from your car.  If your hands/arms were treated, were long sleeves and gloves.  Ice packs every 1-2 hours may be helpful as well.  In the evening, cleanse treated area gently.  Your skin will feel dry; keep treated area moisturized with a moisturizer.  If you have a history of cold sores, take (1) Valtrex 500mg tablet before bedtime, which will be prescribed by your physician.  DAY TWO  If you have a history of cold sores, take (1) Valtrex 500 mg tablets in the morning and in the evening on days 2 and 3.  You may take a shower.  Men should NOT shave if their face was treated.  Cleanse treated area gently.  Apply SPF 50  Most discomfort usually subsides by Day 3.  You should avoid direct sunlight and try to remain indoors on Day 2.  The sensitivity to sunlight will significantly decrease after 48 hours.  You should soak the treated areas with as soft washcloth with cold water for 20 minutes every 4-6 hours.  Ice may be applied after the cold-water soaks, if this feels good.  The area should be patted dry and moisturized following the cold-water soaks.  Follow evening routine as you did on Day 1  DAYS 3- 7    Try to avoid direct sunlight and minimize incidental exposure for one week.  NO BEACHES!  Continue using SPF 50.  This is very important in protecting your newly rejuvenated skin.  You may begin applying make-up once any crusting has healed.  The area may be slightly red for a few weeks.  The skin will feel dry and tightened.  Moisturize once to twice daily.  Who should I call with questions?  [facility]: [phone]  [facility]: [phone]  For urgent needs outside of business hours call the [facility] at [phone] and ask for the dermatology resident on call

## 2024-04-24 ENCOUNTER — OFFICE VISIT (OUTPATIENT)
Dept: DERMATOLOGY | Facility: CLINIC | Age: 79
End: 2024-04-24
Payer: MEDICARE

## 2024-04-24 DIAGNOSIS — L57.0 ACTINIC KERATOSIS: Primary | ICD-10-CM

## 2024-04-24 PROCEDURE — 99207 PHOTODYNAMIC THERAPY - (PDT): CPT | Performed by: DERMATOLOGY

## 2024-04-24 PROCEDURE — 96567 PDT DSTR PRMLG LES SKN: CPT | Performed by: DERMATOLOGY

## 2024-04-24 NOTE — LETTER
4/24/2024         RE: Sabiha Pham  [address]  [facility] [address]        Dear Colleague,    Thank you for referring your patient, Sabiha Pham, to the [facility]. Please see a copy of my visit note below.    Photodynamic Therapy Intake:    If patient has a history of HSV or VZV (shingles in treatment area) they have been given prophylaxis or documented that they refused it:  NO  If Valtrex prescribed, when did patient start?   N/A.    1.Close monitoring for more significant reaction, and avoid saran wrap if YES to any of the following:  New medications since seen by physician? No (If yes, contact supervising physician)  NSAIDs, ibuprofen, aspirin, naproxen, piroxicam: No  Antiarrhythmics (amiodarone, quinidine): No  Hydrochlorothiazide: No    2. Will hold PDT for YES to any of the following:  Pregnancy/breastfeeding/unknown pregnancy: N/A  Sun burn: No  Tetracyclines such as doxycycline: No  Ciprofloxacin: No  Methotrexate: No    3. Will hold LEVULAN for YES to any of the following:   Treatment today is for acne: NO    4. If patient is receiving sand paper (RN only procedure) do they have a history of MRSA:  No    5. Consent within 90 days with MD verified in patient chart?  Yes  The sites to be treated were verified and discussed with patient. Sites verified were scalp.   Expected symptoms and/or complications of redness, peeling, stinging, and burning were reviewed.  Comfort measures including moisturizer, cool compresses, and sun protection were also reviewed with patient.  After review, patient verbalized understanding of procedure and consent form signed by patient (as per MD documentation) and patient agrees to proceed with treatment.     Treatment site(s) cleansed with Hibiclens: Yes   Treatment site(s) de-greased with Acetone: Yes  Applied 1 Levulan stick to the site/s.      MEDICATION: Levulan  DOSE: 1.5 mL  ROUTE: Topical  : [employer]  LOCATION GIVEN: scalp  LOT NO: [id number]  EXP. DATE: 12/2026  NDC: 73629-437-81    TREATMENT NUMBER (30 maximum treatments per site): 3    Photodynamic Therapy(PDT) Post Op Note    Patient successfully completed PDT treatment today. Patient tolerated treatment without complication.  Sites were cleansed with mild soap and water and SPF 50 was applied after treatment.     Sun protection was reviewed with patient:  Patient brought personal hat    After care instructions were reviewed with patient. AVS printed with clinic contact information and given to patient. Patient verbalized understanding and had no further questions or concerns at this time. Patient left clinic in good condition.     Staff Physician Comments:   The RN saw and evaluated the patient (Maribel Spears RN).  I agree with the assessment and plan and description of the procedure as above.   I was immediately available at all times.     Adrian Crowell DO    Department of Dermatology  [facility]: Phone: [phone], Fax:[phone]  [facility]: Phone: [phone], Fax: [phone]      Again, thank you for allowing me to participate in the care of your patient.        Sincerely,        Adrian Crowell MD

## 2024-06-01 ENCOUNTER — HEALTH MAINTENANCE LETTER (OUTPATIENT)
Age: 79
End: 2024-06-01

## 2024-07-17 ENCOUNTER — OFFICE VISIT (OUTPATIENT)
Dept: DERMATOLOGY | Facility: CLINIC | Age: 79
End: 2024-07-17
Payer: MEDICARE

## 2024-07-17 ENCOUNTER — TELEPHONE (OUTPATIENT)
Dept: DERMATOLOGY | Facility: CLINIC | Age: 79
End: 2024-07-17

## 2024-07-17 DIAGNOSIS — L57.0 AK (ACTINIC KERATOSIS): Primary | ICD-10-CM

## 2024-07-17 DIAGNOSIS — Z85.828 HISTORY OF BASAL CELL CARCINOMA OF SKIN: ICD-10-CM

## 2024-07-17 DIAGNOSIS — L80 VITILIGO: ICD-10-CM

## 2024-07-17 DIAGNOSIS — D22.9 MULTIPLE BENIGN NEVI: ICD-10-CM

## 2024-07-17 DIAGNOSIS — L82.1 SEBORRHEIC KERATOSIS: ICD-10-CM

## 2024-07-17 DIAGNOSIS — L81.4 SOLAR LENTIGINOSIS: ICD-10-CM

## 2024-07-17 PROCEDURE — 99213 OFFICE O/P EST LOW 20 MIN: CPT | Mod: 25 | Performed by: DERMATOLOGY

## 2024-07-17 PROCEDURE — 11900 INJECT SKIN LESIONS </W 7: CPT | Performed by: DERMATOLOGY

## 2024-07-17 RX ORDER — TRIAMCINOLONE ACETONIDE 40 MG/ML
8 INJECTION, SUSPENSION INTRA-ARTICULAR; INTRAMUSCULAR ONCE
Status: COMPLETED | OUTPATIENT
Start: 2024-07-17 | End: 2024-07-17

## 2024-07-17 RX ADMIN — TRIAMCINOLONE ACETONIDE 8 MG: 40 INJECTION, SUSPENSION INTRA-ARTICULAR; INTRAMUSCULAR at 16:43

## 2024-07-17 NOTE — LETTER
7/17/2024      Sabiha Pham  [address]  [facility] [address]      Dear Colleague,    Thank you for referring your patient, Sabiha Pham, to the [facility]. Please see a copy of my visit note below.    [facility] Dermatology Note  Encounter Date: Jul 17, 2024  Office Visit     Dermatology Problem List:     1. NMSC  - BCC, R posterior shoulder, superficial, s/p bx 1/11/2024  - BCC, R ala nasi, s/p Mohs and cartilage graft 5/8/23  2. [facility])  3. Hx of Actinic keratoses, s/p cryotherapy    ____________________________________________    Assessment & Plan:    # Hypertrophic Post-surgical scar, R ala nasi, s/p Mohs and cartilage graft 5/8/23  - Patient reports as symptomatic and would like treatment. We discussed intralesional steroid injection treatment including the risks and benefits. He is agreeable to have intralesional steroid injection in office today (see procedure notes below).     # Actinic keratosis, scalp, s/p PDT    - Some residual scaly papules consistent with AK on scalp. Will repeat PDT x3 but increase incubation time to 120 minutes.       # History of NMSC.   - ABCDEs: Counseled ABCDEs of melanoma: Asymmetry, Border (irregularity), Color (not uniform, changes in color), Diameter (greater than 6 mm which is about the size of a pencil eraser), and Evolving (any changes in preexisting moles).  - Sun protection: Counseled SPF30+ sunscreen, UPF clothing, sun avoidance, tanning bed avoidance.   - Recommended skin checks every 6 months.     # Benign lesions: seborrheic keratosis  - Benign nature reviewed. Patient reassured. No treatment is necessary at this time unless the lesions change or becomes symptomatic.   - Sun precaution was advised including the use of sun screens of SPF 30 or higher, sun protective clothing, and avoidance of tanning beds.    # Vitiligo, face and extremities  - stable, some regression off  "light therapy. [facility].   - Continue sun avoidance and sun protection.     Procedures Performed:   - Intra-lesional triamcinolone procedure note. After verbal consent and review of risk of pain and skin thinning/atrophy, positioning and cleansing with isopropyl alcohol, 0.25 total mL of triamcinolone 20 mg/mL was injected into 1 scar on the right nasal ala. The patient tolerated the procedure well and left the dermatology clinic in good condition.      Follow-up: PDT sessions    Staff and Scribe:     Scribe Disclosure:   I, Michelle Viramontes, am serving as a scribe; to document services personally performed by Dr. Adrian Crowell - -based on data collection and the provider's statements to me.     Provider Disclosure:   The documentation recorded by the scribe accurately reflects the services I personally performed and the decisions made by me.    Adrian Crowell DO    Department of Dermatology  [facility]: Phone: [phone], Fax:[phone]  [facility]: Phone: [phone], Fax: [phone]    ____________________________________________    CC: Skin Check (Full body skin check.  Completed 3 PDT treatments on scalp.  No new areas of concern.)    HPI:  Mr. Sabiha Pham is a(n) 78 year old male who presents today as a return patient for a 6 month skin check.  - Last seen 4/24/24 by Dr. Crowell for PDT treatment #3 on the scalp.   - Today the patient reports the PDT appears to of helped with the redness on his scalp. 2 days after treatment he would look like \"a strawberry patch\" but then it would clear up. He states that the light duration has been 60 minutes at his treatments.   - He notes he has 2 blockage sessions scheduled for his spinal stenosis and 2 ablations scheduled.  - There is also a spot of concern on the right ala nasi, where he previously " had Mohs surgery in 2023, that occasionally appears swollen.       Patient is otherwise feeling well, without additional skin concerns.    Labs Reviewed:  N/A    Physical Exam:  Vitals: There were no vitals taken for this visit.  SKIN: Total skin excluding the undergarment areas was performed. The exam included the head/face, neck, both arms, chest, back, abdomen, both legs, digits and/or nails.   - R nasal ala pink sclerotic plaque, retraction giving appearance of adjacent fullness of right nasal tip.   - R upper back, Atrophic hypopigmented patch at site of prior biopsy.   - Scalp with multiple pink macule with superficial gritty scale  - Scattered brown macules on sun exposed areas..   - Multiple regular brown pigmented macules and papules are identified on the trunk and extremities.   - There are waxy stuck on tan to brown papules on the trunk and extremities.   - Hypopigmented patches periorificial on face and bilateral hands  - No other lesions of concern on areas examined.     Medications:  Current Outpatient Medications   Medication Sig Dispense Refill     atenolol (TENORMIN) 50 MG tablet Take 50 mg by mouth daily       atorvastatin (LIPITOR) 10 MG tablet Take 10 mg by mouth daily       betamethasone valerate (VALISONE) 0.1 % ointment Apply to bilateral ears BID PRN itching. 15 g 0     clobetasol (TEMOVATE) 0.05 % external cream APPLY THIN LAYER TO AFFECTED AREA TOPICALLY TWICE A DAY **AVOID FACE,GROIN and ARMPITS *FOR EXTERNAL USE ONLY**USE LONGER THAN 6 WKS MAY CAUSE THINNING OF SKIN  FOR VITILIGO FOR 2 WEEKS, THEN HOLD FOR 1 WEEK AND THEN REPEAT. USE OF  HANDS AND CHEST AS NEEDED **AVOID FACE,GROIN and ARMPITS *FOR EXTERNAL USE ONLY**USE LONGER THAN 6 WKS MAY CAUSE THINNING OF SKIN  FOR VITILIGO FOR 2 WEEKS, THEN HOLD FOR 1 WEEK AND THEN REPEAT. USE OF   HANDS AND CHEST AS NEEDED       gabapentin (NEURONTIN) 300 MG capsule 300 mg       losartan (COZAAR) 100 MG tablet Take 1 tablet by mouth daily        NASAL SPRAY SALINE NA One spray in each nostril once daily       PARoxetine (PAXIL) 20 MG tablet Take 20 mg by mouth every morning       pravastatin (PRAVACHOL) 40 MG tablet 20 mg       sildenafil (VIAGRA) 50 MG tablet 25 mg       tacrolimus (PROTOPIC) 0.1 % external ointment APPLY THIN LAYER TOPICALLY TWICE A DAY TO SCALP, AROUND MOUTH AND GENITALS FOR VITILIGO       tamsulosin (FLOMAX) 0.4 MG capsule 0.4 mg       No current facility-administered medications for this visit.      Past Medical History:   Patient Active Problem List   Diagnosis     Otorrhea     Perforated ear drum     Past Medical History:   Diagnosis Date     Anxiety disorder      Crohn's colitis (H)      Headache(784.0)      Hearing loss      Meniere's disease      Noise effect on both inner ears      DEEDEE (obstructive sleep apnea)      Ringing in the ears      Sleep disorder      Snoring      Weight gain             Again, thank you for allowing me to participate in the care of your patient.        Sincerely,        Adrian Crowell MD

## 2024-07-17 NOTE — TELEPHONE ENCOUNTER
Financial Counselor Review:    Procedure to be performed (include CPT code):  Photodynamic Therapy - 50600  Diagnosis:  actinic keratosis  ICD-10 code:  L57.0  # of treatments requested:  3  Medication order (include J code):  aminolevulonic acid (Levulan) -   Medication dose and frequency: 1-2 Levulan sticks per treatment    Include coverage and patient financial responsibility information:  Yes    Does patient need to be contacted by Financial Counselor:  No    Route response back to the dermatology patient care [place] - [address]

## 2024-07-17 NOTE — NURSING NOTE
Sabiha Pham's goals for this visit include:   Chief Complaint   Patient presents with    Skin Check     Full body skin check.  Completed 3 PDT treatments on scalp.  No new areas of concern.       He requests these members of his care team be copied on today's visit information: n/a    PCP: No Ref-Primary, Physician    Referring Provider:  Referred Self, MD  No address on file    There were no vitals taken for this visit.    Do you need any medication refills at today's visit? No  Mami Coppola RN

## 2024-07-17 NOTE — PROGRESS NOTES
[facility] Dermatology Note  Encounter Date: Jul 17, 2024  Office Visit     Dermatology Problem List:     1. NMSC  - BCC, R posterior shoulder, superficial, s/p bx 1/11/2024  - BCC, R ala nasi, s/p Mohs and cartilage graft 5/8/23  2. [facility]  3. Hx of Actinic keratoses, s/p cryotherapy    ____________________________________________    Assessment & Plan:    # Hypertrophic Post-surgical scar, R ala nasi, s/p Mohs and cartilage graft 5/8/23  - Patient reports as symptomatic and would like treatment. We discussed intralesional steroid injection treatment including the risks and benefits. He is agreeable to have intralesional steroid injection in office today (see procedure notes below).     # Actinic keratosis, scalp, s/p PDT    - Some residual scaly papules consistent with AK on scalp. Will repeat PDT x3 but increase incubation time to 120 minutes.       # History of NMSC.   - ABCDEs: Counseled ABCDEs of melanoma: Asymmetry, Border (irregularity), Color (not uniform, changes in color), Diameter (greater than 6 mm which is about the size of a pencil eraser), and Evolving (any changes in preexisting moles).  - Sun protection: Counseled SPF30+ sunscreen, UPF clothing, sun avoidance, tanning bed avoidance.   - Recommended skin checks every 6 months.     # Benign lesions: seborrheic keratosis  - Benign nature reviewed. Patient reassured. No treatment is necessary at this time unless the lesions change or becomes symptomatic.   - Sun precaution was advised including the use of sun screens of SPF 30 or higher, sun protective clothing, and avoidance of tanning beds.    # Vitiligo, face and extremities  - stable, some regression off light therapy. [facility].   - Continue sun avoidance and sun protection.     Procedures Performed:   - Intra-lesional triamcinolone procedure note. After verbal consent and review of risk of pain  "and skin thinning/atrophy, positioning and cleansing with isopropyl alcohol, 0.25 total mL of triamcinolone 20 mg/mL was injected into 1 scar on the right nasal ala. The patient tolerated the procedure well and left the dermatology clinic in good condition.      Follow-up: PDT sessions    Staff and Scribe:     Scribe Disclosure:   I, Michelleabraham Viramontes, am serving as a scribe; to document services personally performed by Dr. Adrian Crowell - -based on data collection and the provider's statements to me.     Provider Disclosure:   The documentation recorded by the scribe accurately reflects the services I personally performed and the decisions made by me.    Adrian Crowell DO    Department of Dermatology  [facility]: Phone: [phone], Fax:[phone]  [facility]: Phone: [phone], Fax: [phone]    ____________________________________________    CC: Skin Check (Full body skin check.  Completed 3 PDT treatments on scalp.  No new areas of concern.)    HPI:  Mr. Sabiha Pham is a(n) 78 year old male who presents today as a return patient for a 6 month skin check.  - Last seen 4/24/24 by Dr. Crowell for PDT treatment #3 on the scalp.   - Today the patient reports the PDT appears to of helped with the redness on his scalp. 2 days after treatment he would look like \"a strawberry patch\" but then it would clear up. He states that the light duration has been 60 minutes at his treatments.   - He notes he has 2 blockage sessions scheduled for his spinal stenosis and 2 ablations scheduled.  - There is also a spot of concern on the right ala nasi, where he previously had Mohs surgery in 2023, that occasionally appears swollen.       Patient is otherwise feeling well, without additional skin concerns.    Labs Reviewed:  N/A    Physical Exam:  Vitals: There were no vitals taken for this visit.  SKIN: " Total skin excluding the undergarment areas was performed. The exam included the head/face, neck, both arms, chest, back, abdomen, both legs, digits and/or nails.   - R nasal ala pink sclerotic plaque, retraction giving appearance of adjacent fullness of right nasal tip.   - R upper back, Atrophic hypopigmented patch at site of prior biopsy.   - Scalp with multiple pink macule with superficial gritty scale  - Scattered brown macules on sun exposed areas..   - Multiple regular brown pigmented macules and papules are identified on the trunk and extremities.   - There are waxy stuck on tan to brown papules on the trunk and extremities.   - Hypopigmented patches periorificial on face and bilateral hands  - No other lesions of concern on areas examined.     Medications:  Current Outpatient Medications   Medication Sig Dispense Refill    atenolol (TENORMIN) 50 MG tablet Take 50 mg by mouth daily      atorvastatin (LIPITOR) 10 MG tablet Take 10 mg by mouth daily      betamethasone valerate (VALISONE) 0.1 % ointment Apply to bilateral ears BID PRN itching. 15 g 0    clobetasol (TEMOVATE) 0.05 % external cream APPLY THIN LAYER TO AFFECTED AREA TOPICALLY TWICE A DAY **AVOID FACE,GROIN and ARMPITS *FOR EXTERNAL USE ONLY**USE LONGER THAN 6 WKS MAY CAUSE THINNING OF SKIN  FOR VITILIGO FOR 2 WEEKS, THEN HOLD FOR 1 WEEK AND THEN REPEAT. USE OF  HANDS AND CHEST AS NEEDED **AVOID FACE,GROIN and ARMPITS *FOR EXTERNAL USE ONLY**USE LONGER THAN 6 WKS MAY CAUSE THINNING OF SKIN  FOR VITILIGO FOR 2 WEEKS, THEN HOLD FOR 1 WEEK AND THEN REPEAT. USE OF   HANDS AND CHEST AS NEEDED      gabapentin (NEURONTIN) 300 MG capsule 300 mg      losartan (COZAAR) 100 MG tablet Take 1 tablet by mouth daily      NASAL SPRAY SALINE NA One spray in each nostril once daily      PARoxetine (PAXIL) 20 MG tablet Take 20 mg by mouth every morning      pravastatin (PRAVACHOL) 40 MG tablet 20 mg      sildenafil (VIAGRA) 50 MG tablet 25 mg      tacrolimus  (PROTOPIC) 0.1 % external ointment APPLY THIN LAYER TOPICALLY TWICE A DAY TO SCALP, AROUND MOUTH AND GENITALS FOR VITILIGO      tamsulosin (FLOMAX) 0.4 MG capsule 0.4 mg       No current facility-administered medications for this visit.      Past Medical History:   Patient Active Problem List   Diagnosis    Otorrhea    Perforated ear drum     Past Medical History:   Diagnosis Date    Anxiety disorder     Crohn's colitis (H)     Headache(784.0)     Hearing loss     Meniere's disease     Noise effect on both inner ears     DEEDEE (obstructive sleep apnea)     Ringing in the ears     Sleep disorder     Snoring     Weight gain

## 2024-07-19 ENCOUNTER — MYC MEDICAL ADVICE (OUTPATIENT)
Dept: DERMATOLOGY | Facility: CLINIC | Age: 79
End: 2024-07-19
Payer: MEDICARE

## 2024-07-19 NOTE — TELEPHONE ENCOUNTER
CPA note- no PA required   PB DOS: TBD  Type of Procedure: Photodynamic Therapy  Medication dose and frequency: 1-2 Levulan sticks per treatment  CPT Codes: 78776,   ICD10 Codes: L57.0    Surgeon/Ordering provider: Adrian Crowell MD  Pre-cert/Authorization completed: no PA required  Payer: Medicare and [employer]  Spoke to CMS and [employer] follows  Ref. # / Auth #  Valid Dates:    Please advise the patient to contact their insurance for coverage and benefits. Prior authorization is not a guarantee of payment. Payment is based on the patient's benefit plan documents at the time of service

## 2024-08-07 ENCOUNTER — OFFICE VISIT (OUTPATIENT)
Dept: DERMATOLOGY | Facility: CLINIC | Age: 79
End: 2024-08-07
Payer: MEDICARE

## 2024-08-07 DIAGNOSIS — L57.0 AK (ACTINIC KERATOSIS): Primary | ICD-10-CM

## 2024-08-07 PROCEDURE — 96567 PDT DSTR PRMLG LES SKN: CPT | Performed by: DERMATOLOGY

## 2024-08-07 RX ORDER — OFLOXACIN 3 MG/ML
SOLUTION AURICULAR (OTIC)
COMMUNITY
Start: 2024-07-19

## 2024-08-07 RX ORDER — METHOCARBAMOL 750 MG/1
TABLET, FILM COATED ORAL
COMMUNITY
Start: 2024-06-26

## 2024-08-07 RX ORDER — CHLORTHALIDONE 25 MG/1
12.5 TABLET ORAL
COMMUNITY
Start: 2024-01-08

## 2024-08-07 ASSESSMENT — PAIN SCALES - GENERAL: PAINLEVEL: NO PAIN (0)

## 2024-08-07 NOTE — PATIENT INSTRUCTIONS
Photodynamic Therapy (PDT) Home Care Instructions  I will experience redness, swelling, pain and discomfort which may last 5-10 days. I may experience pinkness or redness that persists for 2-3 weeks but longer duration is possible in some individuals. Risks are infection, eye injury, blistering, reactivation of the cold sore virus, skin lightening, skin darkening or scarring. Multiple treatments may be required.   DAY OF TREATMENT  Wash treated area with mild cleanser.  Redness of the treated skin is expected and can vary significantly from person to person and treatment to treatment.  Apply SPF 50 before leaving your home/office and while driving to and from work.  Remain indoors if possible and avoid direct as well as indirect sunlight.  If your head or face were treated, wear a wide brimmed hat while going to and from your car.  If your hands/arms were treated, were long sleeves and gloves.  Ice packs every 1-2 hours may be helpful as well.  In the evening, cleanse treated area gently.  Your skin will feel dry; keep treated area moisturized with a moisturizer.  If you have a history of cold sores, take (1) Valtrex 500mg tablet before bedtime, which will be prescribed by your physician.  DAY TWO  If you have a history of cold sores, take (1) Valtrex 500 mg tablets in the morning and in the evening on days 2 and 3.  You may take a shower.  Men should NOT shave if their face was treated.  Cleanse treated area gently.  Apply SPF 50  Most discomfort usually subsides by Day 3.  You should avoid direct sunlight and try to remain indoors on Day 2.  The sensitivity to sunlight will significantly decrease after 48 hours.  You should soak the treated areas with as soft washcloth with cold water for 20 minutes every 4-6 hours.  Ice may be applied after the cold-water soaks, if this feels good.  The area should be patted dry and moisturized following the cold-water soaks.  Follow evening routine as you did on Day 1  DAYS 3- 7    Try to avoid direct sunlight and minimize incidental exposure for one week.  NO BEACHES!  Continue using SPF 50.  This is very important in protecting your newly rejuvenated skin.  You may begin applying make-up once any crusting has healed.  The area may be slightly red for a few weeks.  The skin will feel dry and tightened.  Moisturize once to twice daily.  Who should I call with questions?  [facility]: [phone]  [facility]: [phone]  For urgent needs outside of business hours call the [facility] at [phone] and ask for the dermatology resident on call

## 2024-08-07 NOTE — LETTER
8/7/2024      Sabiha Pham  [address]  [facility] [address]      Dear Colleague,    Thank you for referring your patient, Sabiha Pham, to the [facility]. Please see a copy of my visit note below.    Photodynamic Therapy Intake:    If patient has a history of HSV or VZV (shingles in treatment area) they have been given prophylaxis or documented that they refused it:  NO  If Valtrex prescribed, when did patient start?   No.    1.Close monitoring for more significant reaction, and avoid saran wrap if YES to any of the following:  New medications since seen by physician? No (If yes, contact supervising physician)  NSAIDs, ibuprofen, aspirin, naproxen, piroxicam: No  Antiarrhythmics (amiodarone, quinidine): No  Hydrochlorothiazide: No    2. Will hold PDT for YES to any of the following:  Pregnancy/breastfeeding/unknown pregnancy: N/A  Sun burn: No  Tetracyclines such as doxycycline: No  Ciprofloxacin: No  Methotrexate: No    3. Will hold LEVULAN for YES to any of the following:   Treatment today is for acne: NO    4. If patient is receiving sand paper (RN only procedure) do they have a history of MRSA:  No    5. Consent within 90 days with MD verified in patient chart?  Yes  The sites to be treated were verified and discussed with patient. Sites verified were scalp.   Expected symptoms and/or complications of redness, peeling, stinging, and burning were reviewed.  Comfort measures including moisturizer, cool compresses, and sun protection were also reviewed with patient.  After review, patient verbalized understanding of procedure and consent form signed by patient (as per MD documentation) and patient agrees to proceed with treatment.     Treatment site(s) cleansed with Technicare: Yes   Treatment site(s) de-greased with Acetone: Yes  Applied 1 Levulan stick to the site/s.  NO, saran wrap applied to N/A, sand paper used on N/A.     MEDICATION: Levulan  DOSE: 1.5 mL  ROUTE:  Topical  : [employer]  LOCATION GIVEN: scalp  LOT NO: [id number]  EXP. DATE: 02-  NDC: 42535-181-20    TREATMENT NUMBER (30 maximum treatments per site): 4    Photodynamic Therapy(PDT) Post Op Note    Patient successfully completed PDT treatment today. Patient tolerated treatment without complication.  Sites were cleansed with mild soap and water and SPF 50 was applied after treatment.     Sun protection was reviewed with patient:  Patient brought personal hat    After care instructions were reviewed with patient. AVS printed with clinic contact information and given to patient. Patient verbalized understanding and had no further questions or concerns at this time. Patient left clinic in good condition.     Staff Physician Comments:   The RN saw and evaluated the patient Ish Morfin RN.  I agree with the assessment and plan and description of the procedure as above.   I was immediately available at all times.     Adrian Crowell DO    Department of Dermatology  [facility]: Phone: [phone], Fax:[phone]  [facility]: Phone: [phone], Fax: [phone]      Again, thank you for allowing me to participate in the care of your patient.        Sincerely,        Adrian Crowell MD

## 2024-08-07 NOTE — PROGRESS NOTES
Photodynamic Therapy Intake:    If patient has a history of HSV or VZV (shingles in treatment area) they have been given prophylaxis or documented that they refused it:  NO  If Valtrex prescribed, when did patient start?   No.    1.Close monitoring for more significant reaction, and avoid saran wrap if YES to any of the following:  New medications since seen by physician? No (If yes, contact supervising physician)  NSAIDs, ibuprofen, aspirin, naproxen, piroxicam: No  Antiarrhythmics (amiodarone, quinidine): No  Hydrochlorothiazide: No    2. Will hold PDT for YES to any of the following:  Pregnancy/breastfeeding/unknown pregnancy: N/A  Sun burn: No  Tetracyclines such as doxycycline: No  Ciprofloxacin: No  Methotrexate: No    3. Will hold LEVULAN for YES to any of the following:   Treatment today is for acne: NO    4. If patient is receiving sand paper (RN only procedure) do they have a history of MRSA:  No    5. Consent within 90 days with MD verified in patient chart?  Yes  The sites to be treated were verified and discussed with patient. Sites verified were scalp.   Expected symptoms and/or complications of redness, peeling, stinging, and burning were reviewed.  Comfort measures including moisturizer, cool compresses, and sun protection were also reviewed with patient.  After review, patient verbalized understanding of procedure and consent form signed by patient (as per MD documentation) and patient agrees to proceed with treatment.     Treatment site(s) cleansed with Technicare: Yes   Treatment site(s) de-greased with Acetone: Yes  Applied 1 Levulan stick to the site/s.  NO, saran wrap applied to N/A, sand paper used on N/A.     MEDICATION: Levulan  DOSE: 1.5 mL  ROUTE: Topical  : [employer]  LOCATION GIVEN: scalp  LOT NO: [id number]  EXP. DATE: 02-  NDC: 22241-119-83    TREATMENT NUMBER (30 maximum treatments per site): 4    Photodynamic Therapy(PDT) Post Op Note    Patient  successfully completed PDT treatment today. Patient tolerated treatment without complication.  Sites were cleansed with mild soap and water and SPF 50 was applied after treatment.     Sun protection was reviewed with patient:  Patient brought personal hat    After care instructions were reviewed with patient. AVS printed with clinic contact information and given to patient. Patient verbalized understanding and had no further questions or concerns at this time. Patient left clinic in good condition.     Staff Physician Comments:   The RN saw and evaluated the patient Ish Morfin RN.  I agree with the assessment and plan and description of the procedure as above.   I was immediately available at all times.     Adrian Crowell DO    Department of Dermatology  [facility]: Phone: [phone], Fax:[phone]  [facility]: Phone: [phone], Fax: [phone]

## 2024-09-06 ENCOUNTER — OFFICE VISIT (OUTPATIENT)
Dept: DERMATOLOGY | Facility: CLINIC | Age: 79
End: 2024-09-06
Payer: MEDICARE

## 2024-09-06 DIAGNOSIS — L57.0 AK (ACTINIC KERATOSIS): ICD-10-CM

## 2024-09-06 PROCEDURE — 96567 PDT DSTR PRMLG LES SKN: CPT | Performed by: DERMATOLOGY

## 2024-09-06 ASSESSMENT — PAIN SCALES - GENERAL: PAINLEVEL: MODERATE PAIN (4)

## 2024-09-06 NOTE — PROGRESS NOTES
Photodynamic Therapy Intake:    If patient has a history of HSV or VZV (shingles in treatment area) they have been given prophylaxis or documented that they refused it:  NO  If Valtrex prescribed, when did patient start?   N/A.    1.Close monitoring for more significant reaction, and avoid saran wrap if YES to any of the following:  New medications since seen by physician? No (If yes, contact supervising physician)  NSAIDs, ibuprofen, aspirin, naproxen, piroxicam: No  Antiarrhythmics (amiodarone, quinidine): No  Hydrochlorothiazide: No    2. Will hold PDT for YES to any of the following:  Pregnancy/breastfeeding/unknown pregnancy: N/A  Sun burn: No  Tetracyclines such as doxycycline: No  Ciprofloxacin: No  Methotrexate: No    3. Will hold LEVULAN for YES to any of the following:   Treatment today is for acne: NO    4. If patient is receiving sand paper (RN only procedure) do they have a history of MRSA:  No    5. Consent within 90 days with MD verified in patient chart?  Yes  The sites to be treated were verified and discussed with patient. Sites verified were scalp.   Expected symptoms and/or complications of redness, peeling, stinging, and burning were reviewed.  Comfort measures including moisturizer, cool compresses, and sun protection were also reviewed with patient.  After review, patient verbalized understanding of procedure and consent form signed by patient (as per MD documentation) and patient agrees to proceed with treatment.     Treatment site(s) cleansed with Technicare: Yes   Treatment site(s) de-greased with Acetone: Yes  Applied 1 Levulan stick to the site/s.  NO, saran wrap applied to N/A, sand paper used on N/A.     MEDICATION: Levulan  DOSE: 1.5 mL  ROUTE: Topical  : [employer]  LOCATION GIVEN: scalp  LOT NO: [id number]  EXP. DATE: 01/01/2027  NDC: 18247-045-40    TREATMENT NUMBER (30 maximum treatments per site): 2    Photodynamic Therapy(PDT) Post Op Note    Patient  successfully completed PDT treatment today. Patient tolerated treatment without complication.  Sites were cleansed with mild soap and water and SPF 50 was applied after treatment.     Sun protection was reviewed with patient:  Patient brought personal hat    After care instructions were reviewed with patient. AVS printed with clinic contact information and given to patient. Patient verbalized understanding and had no further questions or concerns at this time. Patient left clinic in good condition.

## 2024-09-06 NOTE — LETTER
9/6/2024      Sabiha Pham  [address]  [facility] [address]      Dear Colleague,    Thank you for referring your patient, Sabiha Pham, to the [facility]. Please see a copy of my visit note below.        Photodynamic Therapy Intake:    If patient has a history of HSV or VZV (shingles in treatment area) they have been given prophylaxis or documented that they refused it:  NO  If Valtrex prescribed, when did patient start?   N/A.    1.Close monitoring for more significant reaction, and avoid saran wrap if YES to any of the following:  New medications since seen by physician? No (If yes, contact supervising physician)  NSAIDs, ibuprofen, aspirin, naproxen, piroxicam: No  Antiarrhythmics (amiodarone, quinidine): No  Hydrochlorothiazide: No    2. Will hold PDT for YES to any of the following:  Pregnancy/breastfeeding/unknown pregnancy: N/A  Sun burn: No  Tetracyclines such as doxycycline: No  Ciprofloxacin: No  Methotrexate: No    3. Will hold LEVULAN for YES to any of the following:   Treatment today is for acne: NO    4. If patient is receiving sand paper (RN only procedure) do they have a history of MRSA:  No    5. Consent within 90 days with MD verified in patient chart?  Yes  The sites to be treated were verified and discussed with patient. Sites verified were scalp.   Expected symptoms and/or complications of redness, peeling, stinging, and burning were reviewed.  Comfort measures including moisturizer, cool compresses, and sun protection were also reviewed with patient.  After review, patient verbalized understanding of procedure and consent form signed by patient (as per MD documentation) and patient agrees to proceed with treatment.     Treatment site(s) cleansed with [employer]icare: Yes   Treatment site(s) de-greased with Acetone: Yes  Applied 1 Levulan stick to the site/s.  NO, saran wrap applied to N/A, sand paper used on N/A.     MEDICATION: Levulan  DOSE: 1.5 mL  ROUTE:  Topical  : [employer]  LOCATION GIVEN: scalp  LOT NO: [id number]  EXP. DATE: 01/01/2027  NDC: 39444-007-68    TREATMENT NUMBER (30 maximum treatments per site): 2    Photodynamic Therapy(PDT) Post Op Note    Patient successfully completed PDT treatment today. Patient tolerated treatment without complication.  Sites were cleansed with mild soap and water and SPF 50 was applied after treatment.     Sun protection was reviewed with patient:  Patient brought personal hat    After care instructions were reviewed with patient. AVS printed with clinic contact information and given to patient. Patient verbalized understanding and had no further questions or concerns at this time. Patient left clinic in good condition.       Gloria Morfin RN on 11/22/2024 at 2:54 PM      I was in the building at the time of this peel. THis was done by the RN   Yary Machuca MD    Department of Dermatology  [facility]: Phone: [phone], Fax:[phone]  [facility]: Phone: [phone], Fax: [phone]          Pt has spot on face that is red. Concern for non responsive spot after PDT, please schedule with PA-C within 3 months.       Again, thank you for allowing me to participate in the care of your patient.        Sincerely,        Yary Machuca MD

## 2024-09-06 NOTE — PATIENT INSTRUCTIONS
Photodynamic Therapy (PDT) Home Care Instructions  I will experience redness, swelling, pain and discomfort which may last 5-10 days. I may experience pinkness or redness that persists for 2-3 weeks but longer duration is possible in some individuals. Risks are infection, eye injury, blistering, reactivation of the cold sore virus, skin lightening, skin darkening or scarring. Multiple treatments may be required.   DAY OF TREATMENT  Wash treated area with mild cleanser.  Redness of the treated skin is expected and can vary significantly from person to person and treatment to treatment.  Apply SPF 50 before leaving your home/office and while driving to and from work.  Remain indoors if possible and avoid direct as well as indirect sunlight.  If your head or face were treated, wear a wide brimmed hat while going to and from your car.  If your hands/arms were treated, were long sleeves and gloves.  Ice packs every 1-2 hours may be helpful as well.  In the evening, cleanse treated area gently.  Your skin will feel dry; keep treated area moisturized with a moisturizer.  If you have a history of cold sores, take (1) Valtrex 500mg tablet before bedtime, which will be prescribed by your physician.  DAY TWO  If you have a history of cold sores, take (1) Valtrex 500 mg tablets in the morning and in the evening on days 2 and 3.  You may take a shower.  Men should NOT shave if their face was treated.  Cleanse treated area gently.  Apply SPF 50  Most discomfort usually subsides by Day 3.  You should avoid direct sunlight and try to remain indoors on Day 2.  The sensitivity to sunlight will significantly decrease after 48 hours.  You should soak the treated areas with as soft washcloth with cold water for 20 minutes every 4-6 hours.  Ice may be applied after the cold-water soaks, if this feels good.  The area should be patted dry and moisturized following the cold-water soaks.  Follow evening routine as you did on Day 1  DAYS 3- 7    Try to avoid direct sunlight and minimize incidental exposure for one week.  NO BEACHES!  Continue using SPF 50.  This is very important in protecting your newly rejuvenated skin.  You may begin applying make-up once any crusting has healed.  The area may be slightly red for a few weeks.  The skin will feel dry and tightened.  Moisturize once to twice daily.  Who should I call with questions?  [facility]: [phone]  [facility]: [phone]  For urgent needs outside of business hours call the [facility] at [phone] and ask for the dermatology resident on call

## 2024-10-10 ENCOUNTER — OFFICE VISIT (OUTPATIENT)
Dept: DERMATOLOGY | Facility: CLINIC | Age: 79
End: 2024-10-10
Payer: MEDICARE

## 2024-10-10 DIAGNOSIS — L57.0 AK (ACTINIC KERATOSIS): ICD-10-CM

## 2024-10-10 PROCEDURE — 96567 PDT DSTR PRMLG LES SKN: CPT | Performed by: DERMATOLOGY

## 2024-10-10 ASSESSMENT — PAIN SCALES - GENERAL: PAINLEVEL: NO PAIN (0)

## 2024-10-10 NOTE — PROGRESS NOTES
Photodynamic Therapy Intake:    If patient has a history of HSV or VZV (shingles in treatment area) they have been given prophylaxis or documented that they refused it:  NO  If Valtrex prescribed, when did patient start?   N/A.    1.Close monitoring for more significant reaction, and avoid saran wrap if YES to any of the following:  New medications since seen by physician? No (If yes, contact supervising physician)  NSAIDs, ibuprofen, aspirin, naproxen, piroxicam: No  Antiarrhythmics (amiodarone, quinidine): No  Hydrochlorothiazide: No    2. Will hold PDT for YES to any of the following:  Pregnancy/breastfeeding/unknown pregnancy: N/A  Sun burn: No  Tetracyclines such as doxycycline: No  Ciprofloxacin: No  Methotrexate: No    3. Will hold LEVULAN for YES to any of the following:   Treatment today is for acne: NO    4. If patient is receiving sand paper (RN only procedure) do they have a history of MRSA:  No    5. Consent within 90 days with MD verified in patient chart?  Yes  The sites to be treated were verified and discussed with patient. Sites verified were scalp.   Expected symptoms and/or complications of redness, peeling, stinging, and burning were reviewed.  Comfort measures including moisturizer, cool compresses, and sun protection were also reviewed with patient.  After review, patient verbalized understanding of procedure and consent form signed by patient (as per MD documentation) and patient agrees to proceed with treatment.     Treatment site(s) cleansed with Technicare: Yes   Treatment site(s) de-greased with Acetone: Yes  Applied 1 Levulan stick to the site/s.  NO, saran wrap applied to N/A, sand paper used on N/A.     MEDICATION: Levulan  DOSE: 1.5 mL  ROUTE: Topical  : [employer]  LOCATION GIVEN: scalp  LOT NO: [id number]  EXP. DATE: 12/01/2026  NDC: 18532-790-44    TREATMENT NUMBER (30 maximum treatments per site): 3    Photodynamic Therapy(PDT) Post Op Note    Patient  successfully completed PDT treatment today. Patient tolerated treatment without complication.  Sites were cleansed with mild soap and water and SPF 50 was applied after treatment.     Sun protection was reviewed with patient:  Patient brought personal hat    After care instructions were reviewed with patient. AVS printed with clinic contact information and given to patient. Patient verbalized understanding and had no further questions or concerns at this time. Patient left clinic in good condition.

## 2024-10-10 NOTE — LETTER
10/10/2024      Sabiha Pham  [address]  [facility] [address]      Dear Colleague,    Thank you for referring your patient, Sabiha Pham, to the [facility]. Please see a copy of my visit note below.    Photodynamic Therapy Intake:    If patient has a history of HSV or VZV (shingles in treatment area) they have been given prophylaxis or documented that they refused it:  NO  If Valtrex prescribed, when did patient start?   N/A.    1.Close monitoring for more significant reaction, and avoid saran wrap if YES to any of the following:  New medications since seen by physician? No (If yes, contact supervising physician)  NSAIDs, ibuprofen, aspirin, naproxen, piroxicam: No  Antiarrhythmics (amiodarone, quinidine): No  Hydrochlorothiazide: No    2. Will hold PDT for YES to any of the following:  Pregnancy/breastfeeding/unknown pregnancy: N/A  Sun burn: No  Tetracyclines such as doxycycline: No  Ciprofloxacin: No  Methotrexate: No    3. Will hold LEVULAN for YES to any of the following:   Treatment today is for acne: NO    4. If patient is receiving sand paper (RN only procedure) do they have a history of MRSA:  No    5. Consent within 90 days with MD verified in patient chart?  Yes  The sites to be treated were verified and discussed with patient. Sites verified were scalp.   Expected symptoms and/or complications of redness, peeling, stinging, and burning were reviewed.  Comfort measures including moisturizer, cool compresses, and sun protection were also reviewed with patient.  After review, patient verbalized understanding of procedure and consent form signed by patient (as per MD documentation) and patient agrees to proceed with treatment.     Treatment site(s) cleansed with [employer]icare: Yes   Treatment site(s) de-greased with Acetone: Yes  Applied 1 Levulan stick to the site/s.  NO, saran wrap applied to N/A, sand paper used on N/A.     MEDICATION: Levulan  DOSE: 1.5 mL  ROUTE:  Topical  : [employer]  LOCATION GIVEN: scalp  LOT NO: [id number]  EXP. DATE: 12/01/2026  NDC: 50088-644-56    TREATMENT NUMBER (30 maximum treatments per site): 3    Photodynamic Therapy(PDT) Post Op Note    Patient successfully completed PDT treatment today. Patient tolerated treatment without complication.  Sites were cleansed with mild soap and water and SPF 50 was applied after treatment.     Sun protection was reviewed with patient:  Patient brought personal hat    After care instructions were reviewed with patient. AVS printed with clinic contact information and given to patient. Patient verbalized understanding and had no further questions or concerns at this time. Patient left clinic in good condition.     I was present in the building at the time of this visit.       Again, thank you for allowing me to participate in the care of your patient.        Sincerely,        Yary Machuca MD

## 2024-10-10 NOTE — PATIENT INSTRUCTIONS
Photodynamic Therapy (PDT) Home Care Instructions  I will experience redness, swelling, pain and discomfort which may last 5-10 days. I may experience pinkness or redness that persists for 2-3 weeks but longer duration is possible in some individuals. Risks are infection, eye injury, blistering, reactivation of the cold sore virus, skin lightening, skin darkening or scarring. Multiple treatments may be required.   DAY OF TREATMENT  Wash treated area with mild cleanser.  Redness of the treated skin is expected and can vary significantly from person to person and treatment to treatment.  Apply SPF 50 before leaving your home/office and while driving to and from work.  Remain indoors if possible and avoid direct as well as indirect sunlight.  If your head or face were treated, wear a wide brimmed hat while going to and from your car.  If your hands/arms were treated, were long sleeves and gloves.  Ice packs every 1-2 hours may be helpful as well.  In the evening, cleanse treated area gently.  Your skin will feel dry; keep treated area moisturized with a moisturizer.  If you have a history of cold sores, take (1) Valtrex 500mg tablet before bedtime, which will be prescribed by your physician.  DAY TWO  If you have a history of cold sores, take (1) Valtrex 500 mg tablets in the morning and in the evening on days 2 and 3.  You may take a shower.  Men should NOT shave if their face was treated.  Cleanse treated area gently.  Apply SPF 50  Most discomfort usually subsides by Day 3.  You should avoid direct sunlight and try to remain indoors on Day 2.  The sensitivity to sunlight will significantly decrease after 48 hours.  You should soak the treated areas with as soft washcloth with cold water for 20 minutes every 4-6 hours.  Ice may be applied after the cold-water soaks, if this feels good.  The area should be patted dry and moisturized following the cold-water soaks.  Follow evening routine as you did on Day 1  DAYS 3- 7    Try to avoid direct sunlight and minimize incidental exposure for one week.  NO BEACHES!  Continue using SPF 50.  This is very important in protecting your newly rejuvenated skin.  You may begin applying make-up once any crusting has healed.  The area may be slightly red for a few weeks.  The skin will feel dry and tightened.  Moisturize once to twice daily.  Who should I call with questions?  [facility]: [phone]  [facility]: [phone]  For urgent needs outside of business hours call the [facility] at [phone] and ask for the dermatology resident on call

## 2024-11-22 NOTE — PROGRESS NOTES
Pt has spot on face that is red. Concern for non responsive spot after PDT, please schedule with PA-MARK within 3 months.

## 2025-01-08 ENCOUNTER — MYC MEDICAL ADVICE (OUTPATIENT)
Dept: DERMATOLOGY | Facility: CLINIC | Age: 80
End: 2025-01-08
Payer: MEDICARE

## 2025-03-06 ENCOUNTER — OFFICE VISIT (OUTPATIENT)
Dept: DERMATOLOGY | Facility: CLINIC | Age: 80
End: 2025-03-06
Payer: MEDICARE

## 2025-03-06 DIAGNOSIS — L82.1 SEBORRHEIC KERATOSIS: ICD-10-CM

## 2025-03-06 DIAGNOSIS — D22.9 MULTIPLE BENIGN NEVI: ICD-10-CM

## 2025-03-06 DIAGNOSIS — Z85.828 HISTORY OF BASAL CELL CARCINOMA OF SKIN: Primary | ICD-10-CM

## 2025-03-06 DIAGNOSIS — L57.0 ACTINIC KERATOSIS: ICD-10-CM

## 2025-03-06 DIAGNOSIS — L81.4 SOLAR LENTIGINOSIS: ICD-10-CM

## 2025-03-06 DIAGNOSIS — L80 VITILIGO: ICD-10-CM

## 2025-03-06 NOTE — NURSING NOTE
Sabiha Pham's goals for this visit include:   Chief Complaint   Patient presents with    Skin Check     Patient is here for a skin check, areas of concern none would like 3 more blue light treatments HX of BCC       He requests these members of his care team be copied on today's visit information:     PCP: No Ref-Primary, Physician    Referring Provider:  Referred Self, MD  No address on file    There were no vitals taken for this visit.    Do you need any medication refills at today's visit?       Melani Fong EMT

## 2025-03-06 NOTE — PROGRESS NOTES
[facility] Dermatology Note  Encounter Date: Mar 6, 2025  Office Visit     Dermatology Problem List:  1. NMSC  - BCC, R posterior shoulder, superficial, s/p bx 1/11/2024  - BCC, R ala nasi, s/p Mohs and cartilage graft 5/8/23  2. [facility] ([facility])  3. Hx of Actinic keratoses, s/p cryotherapy, s/p PDT 8/7/24, 9/6/24, 10/10/24    ____________________________________________    Assessment & Plan:    # History of NMSC.   - Sun protection: Counseled SPF30+ sunscreen, UPF clothing, sun avoidance, tanning bed avoidance.   - Recommended skin checks every 6 months.     # Vitiligo,dorsal bilateral hands and anterior chest, stable persistent on home NBUVB treatment.   - discussed the option for topical treatments such as Opzelura, pt is interested in this.  - Opzelura 1.5% BID application up to 24 weeks, if not covered by civilian health insurance, will explore [facility] benefits.     - Meanwhile continue NBUVB treatment with [facility]    # Actinic keratosis, scalp, s/p 6 PDT sessions  - discussed the option for topical treatment such as fluorouracil and calcipotriene, however, pt states that he has 3 different topicals at home so he will go home and check to see if he already has those and get back to us.   - If he has 5-fluorouracil and calcipotriene, will have patient do treatment cycles on scalp before next follow up.     # Benign lesions: Seborrheic keratoses, lentigines, and multiple benign nevi  - Reassurance provided; no treatment is medically necessary    Procedures Performed:   None    Follow-up: 6 month(s) in-person, or earlier for new or changing lesions    Staff and Scribe:   I, DEREK NASH, am serving as a scribe; to document services personally performed by Adrian Crowell MD -based on data collection and the provider's statements to me.    Staff Physician Comments:   I saw and evaluated the patient with the Mohs Surgery Fellow (Dr. Dat Mckeon) and I agree  with the assessment and plan. I was present for the entire exam.    Adrian Crowell DO    Department of Dermatology  [facility]: Phone: [phone], Fax:[phone]  [facility]: Phone: [phone], Fax: [phone]    ____________________________________________    CC: Skin Check (Patient is here for a skin check, areas of concern none would like 3 more blue light treatments HX of BCC)    HPI:  Mr. Sabiha Pham is a(n) 79 year old male who presents today as a return patient for FBSE. Pt was last seen by  on 10/10/24 for #3 PDT. Today, pt reports interest in continuing PDT treatments. Also mentioned concern regarding the vitiligo. Pt states that it doesn't cause any pain or discomfort but he just doesn't like the visual aspect of it. Reports that he has enough treatment code for the home light unit and [facility]       Patient is otherwise feeling well, without additional skin concerns.    Labs Reviewed:  N/A    Physical Exam:  Vitals: There were no vitals taken for this visit.  SKIN: Full skin, which includes the head/face, both arms, chest, back, abdomen,both legs, genitalia and/or groin buttocks, digits and/or nails, was examined.  - Vitiligo on the dorsal bilateral hands and anterior chest  - There are waxy stuck on tan to brown papules on the trunk and extremities.   - Multiple regular brown pigmented macules and papules are identified on the trunk and extremities.   - Scattered brown macules on sun exposed areas.   - No other lesions of concern on areas examined.     Medications:  Current Outpatient Medications   Medication Sig Dispense Refill    atenolol (TENORMIN) 50 MG tablet Take 50 mg by mouth daily      atorvastatin (LIPITOR) 10 MG tablet Take 10 mg by mouth daily      betamethasone valerate (VALISONE) 0.1 % ointment Apply to bilateral ears BID PRN  itching. 15 g 0    chlorthalidone (HYGROTON) 25 MG tablet Take 12.5 mg by mouth      clobetasol (TEMOVATE) 0.05 % external cream APPLY THIN LAYER TO AFFECTED AREA TOPICALLY TWICE A DAY **AVOID FACE,GROIN and ARMPITS *FOR EXTERNAL USE ONLY**USE LONGER THAN 6 WKS MAY CAUSE THINNING OF SKIN  FOR VITILIGO FOR 2 WEEKS, THEN HOLD FOR 1 WEEK AND THEN REPEAT. USE OF  HANDS AND CHEST AS NEEDED **AVOID FACE,GROIN and ARMPITS *FOR EXTERNAL USE ONLY**USE LONGER THAN 6 WKS MAY CAUSE THINNING OF SKIN  FOR VITILIGO FOR 2 WEEKS, THEN HOLD FOR 1 WEEK AND THEN REPEAT. USE OF   HANDS AND CHEST AS NEEDED      diclofenac (VOLTAREN) 1 % topical gel Apply 2 g topically 4 times daily.      gabapentin (NEURONTIN) 300 MG capsule 300 mg      losartan (COZAAR) 100 MG tablet Take 1 tablet by mouth daily      methocarbamol (ROBAXIN) 750 MG tablet TAKE 1 TO 2 TABLETS BY MOUTH THREE TIMES DAILY AS NEEDED      NASAL SPRAY SALINE NA One spray in each nostril once daily      ofloxacin (FLOXIN) 0.3 % otic solution INSTILL 10 DROPS TO RIGHT EAR DAILY      PARoxetine (PAXIL) 20 MG tablet Take 20 mg by mouth every morning      pravastatin (PRAVACHOL) 40 MG tablet 20 mg      sildenafil (VIAGRA) 50 MG tablet 25 mg      tacrolimus (PROTOPIC) 0.1 % external ointment APPLY THIN LAYER TOPICALLY TWICE A DAY TO SCALP, AROUND MOUTH AND GENITALS FOR VITILIGO      tamsulosin (FLOMAX) 0.4 MG capsule 0.4 mg       No current facility-administered medications for this visit.      Past Medical History:   Patient Active Problem List   Diagnosis    Otorrhea    Perforated ear drum     Past Medical History:   Diagnosis Date    Anxiety disorder     Crohn's colitis (H)     Headache(784.0)     Hearing loss     Meniere's disease     Noise effect on both inner ears     DEEDEE (obstructive sleep apnea)     Ringing in the ears     Sleep disorder     Snoring     Weight gain

## 2025-03-06 NOTE — LETTER
3/6/2025      Sabiha Pham  [address]  [place] [address]      Dear Colleague,    Thank you for referring your patient, Sabiha Pham, to the [facility]. Please see a copy of my visit note below.    [facility] Dermatology Note  Encounter Date: Mar 6, 2025  Office Visit     Dermatology Problem List:  1. NMSC  - BCC, R posterior shoulder, superficial, s/p bx 1/11/2024  - BCC, R ala nasi, s/p Mohs and cartilage graft 5/8/23  2. [facility]iligo ([facility])  3. Hx of Actinic keratoses, s/p cryotherapy, s/p PDT 8/7/24, 9/6/24, 10/10/24    ____________________________________________    Assessment & Plan:    # History of NMSC.   - Sun protection: Counseled SPF30+ sunscreen, UPF clothing, sun avoidance, tanning bed avoidance.   - Recommended skin checks every 6 months.     # Vitiligo,dorsal bilateral hands and anterior chest, stable persistent on home NBUVB treatment.   - discussed the option for topical treatments such as Opzelura, pt is interested in this.  - Opzelura 1.5% BID application up to 24 weeks, if not covered by civilian health insurance, will explore [facility] benefits.     - Meanwhile continue NBUVB treatment with [facility]    # Actinic keratosis, scalp, s/p 6 PDT sessions  - discussed the option for topical treatment such as fluorouracil and calcipotriene, however, pt states that he has 3 different topicals at home so he will go home and check to see if he already has those and get back to us.   - If he has 5-fluorouracil and calcipotriene, will have patient do treatment cycles on scalp before next follow up.     # Benign lesions: Seborrheic keratoses, lentigines, and multiple benign nevi  - Reassurance provided; no treatment is medically necessary    Procedures Performed:   None    Follow-up: 6 month(s) in-person, or earlier for new or changing lesions    Staff and Scribe:   I, BABINA SAAD, am serving as a scribe; to document  services personally performed by Adrian Crowell MD -based on data collection and the provider's statements to me.    Staff Physician Comments:   I saw and evaluated the patient with the Mohs Surgery Fellow (Dr. Dat Mckeon) and I agree with the assessment and plan. I was present for the entire exam.    Adrian Crowell DO    Department of Dermatology  [facility]: Phone: [phone], Fax:[phone]  [facility]: Phone: [phone], Fax: [phone]    ____________________________________________    CC: Skin Check (Patient is here for a skin check, areas of concern none would like 3 more blue light treatments HX of BCC)    HPI:  Mr. Sabiha Pham is a(n) 79 year old male who presents today as a return patient for FBSE. Pt was last seen by  on 10/10/24 for #3 PDT. Today, pt reports interest in continuing PDT treatments. Also mentioned concern regarding the vitiligo. Pt states that it doesn't cause any pain or discomfort but he just doesn't like the visual aspect of it. Reports that he has enough treatment code for the home light unit and [facility]       Patient is otherwise feeling well, without additional skin concerns.    Labs Reviewed:  N/A    Physical Exam:  Vitals: There were no vitals taken for this visit.  SKIN: Full skin, which includes the head/face, both arms, chest, back, abdomen,both legs, genitalia and/or groin buttocks, digits and/or nails, was examined.  - Vitiligo on the dorsal bilateral hands and anterior chest  - There are waxy stuck on tan to brown papules on the trunk and extremities.   - Multiple regular brown pigmented macules and papules are identified on the trunk and extremities.   - Scattered brown macules on sun exposed areas.   - No other lesions of concern on areas examined.     Medications:  Current Outpatient Medications    Medication Sig Dispense Refill     atenolol (TENORMIN) 50 MG tablet Take 50 mg by mouth daily       atorvastatin (LIPITOR) 10 MG tablet Take 10 mg by mouth daily       betamethasone valerate (VALISONE) 0.1 % ointment Apply to bilateral ears BID PRN itching. 15 g 0     chlorthalidone (HYGROTON) 25 MG tablet Take 12.5 mg by mouth       clobetasol (TEMOVATE) 0.05 % external cream APPLY THIN LAYER TO AFFECTED AREA TOPICALLY TWICE A DAY **AVOID FACE,GROIN and ARMPITS *FOR EXTERNAL USE ONLY**USE LONGER THAN 6 WKS MAY CAUSE THINNING OF SKIN  FOR VITILIGO FOR 2 WEEKS, THEN HOLD FOR 1 WEEK AND THEN REPEAT. USE OF  HANDS AND CHEST AS NEEDED **AVOID FACE,GROIN and ARMPITS *FOR EXTERNAL USE ONLY**USE LONGER THAN 6 WKS MAY CAUSE THINNING OF SKIN  FOR VITILIGO FOR 2 WEEKS, THEN HOLD FOR 1 WEEK AND THEN REPEAT. USE OF   HANDS AND CHEST AS NEEDED       diclofenac (VOLTAREN) 1 % topical gel Apply 2 g topically 4 times daily.       gabapentin (NEURONTIN) 300 MG capsule 300 mg       losartan (COZAAR) 100 MG tablet Take 1 tablet by mouth daily       methocarbamol (ROBAXIN) 750 MG tablet TAKE 1 TO 2 TABLETS BY MOUTH THREE TIMES DAILY AS NEEDED       NASAL SPRAY SALINE NA One spray in each nostril once daily       ofloxacin (FLOXIN) 0.3 % otic solution INSTILL 10 DROPS TO RIGHT EAR DAILY       PARoxetine (PAXIL) 20 MG tablet Take 20 mg by mouth every morning       pravastatin (PRAVACHOL) 40 MG tablet 20 mg       sildenafil (VIAGRA) 50 MG tablet 25 mg       tacrolimus (PROTOPIC) 0.1 % external ointment APPLY THIN LAYER TOPICALLY TWICE A DAY TO SCALP, AROUND MOUTH AND GENITALS FOR VITILIGO       tamsulosin (FLOMAX) 0.4 MG capsule 0.4 mg       No current facility-administered medications for this visit.      Past Medical History:   Patient Active Problem List   Diagnosis     Otorrhea     Perforated ear drum     Past Medical History:   Diagnosis Date     Anxiety disorder      Crohn's colitis (H)      Headache(784.0)      Hearing loss       Meniere's disease      Noise effect on both inner ears      DEEDEE (obstructive sleep apnea)      Ringing in the ears      Sleep disorder      Snoring      Weight gain             Again, thank you for allowing me to participate in the care of your patient.        Sincerely,        Adrian Crowell MD    Electronically signed

## 2025-03-07 ENCOUNTER — MYC MEDICAL ADVICE (OUTPATIENT)
Dept: DERMATOLOGY | Facility: CLINIC | Age: 80
End: 2025-03-07
Payer: MEDICARE

## 2025-06-14 ENCOUNTER — HEALTH MAINTENANCE LETTER (OUTPATIENT)
Age: 80
End: 2025-06-14

## (undated) DEVICE — SOL NACL 0.9% IRRIG 1000ML BOTTLE 07138-09

## (undated) DEVICE — SOL WATER IRRIG 1000ML BOTTLE 07139-09

## (undated) DEVICE — ROBOTIP CORNEAL MARKER

## (undated) RX ORDER — TROPICAMIDE 10 MG/ML
SOLUTION/ DROPS OPHTHALMIC
Status: DISPENSED
Start: 2021-04-14

## (undated) RX ORDER — TROPICAMIDE 10 MG/ML
SOLUTION/ DROPS OPHTHALMIC
Status: DISPENSED
Start: 2021-03-17